# Patient Record
Sex: MALE | Race: BLACK OR AFRICAN AMERICAN | NOT HISPANIC OR LATINO
[De-identification: names, ages, dates, MRNs, and addresses within clinical notes are randomized per-mention and may not be internally consistent; named-entity substitution may affect disease eponyms.]

---

## 2017-01-10 ENCOUNTER — APPOINTMENT (OUTPATIENT)
Dept: VASCULAR SURGERY | Facility: CLINIC | Age: 62
End: 2017-01-10

## 2017-03-25 ENCOUNTER — EMERGENCY (EMERGENCY)
Facility: HOSPITAL | Age: 62
LOS: 1 days | Discharge: HOME | End: 2017-03-25
Attending: EMERGENCY MEDICINE | Admitting: EMERGENCY MEDICINE
Payer: COMMERCIAL

## 2017-03-25 VITALS
HEART RATE: 84 BPM | SYSTOLIC BLOOD PRESSURE: 112 MMHG | OXYGEN SATURATION: 98 % | TEMPERATURE: 98 F | RESPIRATION RATE: 18 BRPM | DIASTOLIC BLOOD PRESSURE: 59 MMHG

## 2017-03-25 DIAGNOSIS — Z87.2 PERSONAL HISTORY OF DISEASES OF THE SKIN AND SUBCUTANEOUS TISSUE: Chronic | ICD-10-CM

## 2017-03-25 LAB
ALBUMIN SERPL ELPH-MCNC: 4.2 G/DL — SIGNIFICANT CHANGE UP (ref 3.3–5)
ALP SERPL-CCNC: 57 U/L — SIGNIFICANT CHANGE UP (ref 40–120)
ALT FLD-CCNC: 21 U/L — SIGNIFICANT CHANGE UP (ref 4–41)
APTT BLD: 32.6 SEC — SIGNIFICANT CHANGE UP (ref 27.5–37.4)
AST SERPL-CCNC: 28 U/L — SIGNIFICANT CHANGE UP (ref 4–40)
BASOPHILS # BLD AUTO: 0.02 K/UL — SIGNIFICANT CHANGE UP (ref 0–0.2)
BASOPHILS NFR BLD AUTO: 0.4 % — SIGNIFICANT CHANGE UP (ref 0–2)
BILIRUB SERPL-MCNC: 0.4 MG/DL — SIGNIFICANT CHANGE UP (ref 0.2–1.2)
BUN SERPL-MCNC: 13 MG/DL — SIGNIFICANT CHANGE UP (ref 7–23)
CALCIUM SERPL-MCNC: 9.5 MG/DL — SIGNIFICANT CHANGE UP (ref 8.4–10.5)
CHLORIDE SERPL-SCNC: 100 MMOL/L — SIGNIFICANT CHANGE UP (ref 98–107)
CK MB BLD-MCNC: 1.82 NG/ML — SIGNIFICANT CHANGE UP (ref 1–6.6)
CK SERPL-CCNC: 248 U/L — HIGH (ref 30–200)
CK SERPL-CCNC: 271 U/L — HIGH (ref 30–200)
CK SERPL-CCNC: 284 U/L — HIGH (ref 30–200)
CO2 SERPL-SCNC: 29 MMOL/L — SIGNIFICANT CHANGE UP (ref 22–31)
CREAT SERPL-MCNC: 1.18 MG/DL — SIGNIFICANT CHANGE UP (ref 0.5–1.3)
EOSINOPHIL # BLD AUTO: 0.06 K/UL — SIGNIFICANT CHANGE UP (ref 0–0.5)
EOSINOPHIL NFR BLD AUTO: 1.2 % — SIGNIFICANT CHANGE UP (ref 0–6)
ERYTHROCYTE [SEDIMENTATION RATE] IN BLOOD: 5 MM/HR — SIGNIFICANT CHANGE UP (ref 1–15)
GLUCOSE SERPL-MCNC: 83 MG/DL — SIGNIFICANT CHANGE UP (ref 70–99)
HCT VFR BLD CALC: 41.6 % — SIGNIFICANT CHANGE UP (ref 39–50)
HGB BLD-MCNC: 13.6 G/DL — SIGNIFICANT CHANGE UP (ref 13–17)
IMM GRANULOCYTES NFR BLD AUTO: 0.2 % — SIGNIFICANT CHANGE UP (ref 0–1.5)
INR BLD: 1.06 — SIGNIFICANT CHANGE UP (ref 0.88–1.17)
LYMPHOCYTES # BLD AUTO: 1.81 K/UL — SIGNIFICANT CHANGE UP (ref 1–3.3)
LYMPHOCYTES # BLD AUTO: 36.7 % — SIGNIFICANT CHANGE UP (ref 13–44)
MCHC RBC-ENTMCNC: 27.3 PG — SIGNIFICANT CHANGE UP (ref 27–34)
MCHC RBC-ENTMCNC: 32.7 % — SIGNIFICANT CHANGE UP (ref 32–36)
MCV RBC AUTO: 83.4 FL — SIGNIFICANT CHANGE UP (ref 80–100)
MONOCYTES # BLD AUTO: 0.34 K/UL — SIGNIFICANT CHANGE UP (ref 0–0.9)
MONOCYTES NFR BLD AUTO: 6.9 % — SIGNIFICANT CHANGE UP (ref 2–14)
NEUTROPHILS # BLD AUTO: 2.69 K/UL — SIGNIFICANT CHANGE UP (ref 1.8–7.4)
NEUTROPHILS NFR BLD AUTO: 54.6 % — SIGNIFICANT CHANGE UP (ref 43–77)
PLATELET # BLD AUTO: 246 K/UL — SIGNIFICANT CHANGE UP (ref 150–400)
PMV BLD: 9.4 FL — SIGNIFICANT CHANGE UP (ref 7–13)
POTASSIUM SERPL-MCNC: 4 MMOL/L — SIGNIFICANT CHANGE UP (ref 3.5–5.3)
POTASSIUM SERPL-SCNC: 4 MMOL/L — SIGNIFICANT CHANGE UP (ref 3.5–5.3)
PROT SERPL-MCNC: 6.8 G/DL — SIGNIFICANT CHANGE UP (ref 6–8.3)
PROTHROM AB SERPL-ACNC: 11.9 SEC — SIGNIFICANT CHANGE UP (ref 9.8–13.1)
RBC # BLD: 4.99 M/UL — SIGNIFICANT CHANGE UP (ref 4.2–5.8)
RBC # FLD: 15.6 % — HIGH (ref 10.3–14.5)
SODIUM SERPL-SCNC: 140 MMOL/L — SIGNIFICANT CHANGE UP (ref 135–145)
TROPONIN T SERPL-MCNC: < 0.06 NG/ML — SIGNIFICANT CHANGE UP (ref 0–0.06)
TROPONIN T SERPL-MCNC: < 0.06 NG/ML — SIGNIFICANT CHANGE UP (ref 0–0.06)
WBC # BLD: 4.93 K/UL — SIGNIFICANT CHANGE UP (ref 3.8–10.5)
WBC # FLD AUTO: 4.93 K/UL — SIGNIFICANT CHANGE UP (ref 3.8–10.5)

## 2017-03-25 PROCEDURE — 99220: CPT | Mod: 25

## 2017-03-25 PROCEDURE — 71020: CPT | Mod: 26

## 2017-03-25 PROCEDURE — 71275 CT ANGIOGRAPHY CHEST: CPT | Mod: 26

## 2017-03-25 PROCEDURE — 74174 CTA ABD&PLVS W/CONTRAST: CPT | Mod: 26

## 2017-03-25 PROCEDURE — 93010 ELECTROCARDIOGRAM REPORT: CPT | Mod: 59

## 2017-03-25 PROCEDURE — 99284 EMERGENCY DEPT VISIT MOD MDM: CPT

## 2017-03-25 RX ORDER — NIFEDIPINE 30 MG
30 TABLET, EXTENDED RELEASE 24 HR ORAL
Qty: 0 | Refills: 0 | Status: DISCONTINUED | OUTPATIENT
Start: 2017-03-25 | End: 2017-03-29

## 2017-03-25 RX ORDER — ATORVASTATIN CALCIUM 80 MG/1
80 TABLET, FILM COATED ORAL AT BEDTIME
Qty: 0 | Refills: 0 | Status: DISCONTINUED | OUTPATIENT
Start: 2017-03-25 | End: 2017-03-29

## 2017-03-25 RX ORDER — NIFEDIPINE 30 MG
1 TABLET, EXTENDED RELEASE 24 HR ORAL
Qty: 0 | Refills: 0 | COMMUNITY

## 2017-03-25 RX ORDER — VALSARTAN 80 MG/1
1 TABLET ORAL
Qty: 0 | Refills: 0 | COMMUNITY

## 2017-03-25 RX ORDER — ASPIRIN/CALCIUM CARB/MAGNESIUM 324 MG
162 TABLET ORAL ONCE
Qty: 0 | Refills: 0 | Status: COMPLETED | OUTPATIENT
Start: 2017-03-25 | End: 2017-03-25

## 2017-03-25 RX ORDER — TAMSULOSIN HYDROCHLORIDE 0.4 MG/1
0.4 CAPSULE ORAL AT BEDTIME
Qty: 0 | Refills: 0 | Status: DISCONTINUED | OUTPATIENT
Start: 2017-03-25 | End: 2017-03-29

## 2017-03-25 RX ORDER — TAMSULOSIN HYDROCHLORIDE 0.4 MG/1
1 CAPSULE ORAL
Qty: 0 | Refills: 0 | COMMUNITY

## 2017-03-25 RX ORDER — ROSUVASTATIN CALCIUM 5 MG/1
1 TABLET ORAL
Qty: 0 | Refills: 0 | COMMUNITY

## 2017-03-25 RX ORDER — VALSARTAN 80 MG/1
320 TABLET ORAL DAILY
Qty: 0 | Refills: 0 | Status: DISCONTINUED | OUTPATIENT
Start: 2017-03-25 | End: 2017-03-29

## 2017-03-25 RX ADMIN — Medication 162 MILLIGRAM(S): at 12:34

## 2017-03-25 NOTE — ED ADULT NURSE REASSESSMENT NOTE - NS ED NURSE REASSESS COMMENT FT1
Report taken from daytime RN- pt remains A&Ox3, denies chest pain, dizziness, lightheadedness, SOB, HA, vision changes at this time. Remains in NSR on tele monitor, appears comfortable in stretcher, respirations even and unlabored, NAD noted. Repeat blood work resulted, report given to DAHLIA Dawson in CDU and pt transported to CDU.

## 2017-03-25 NOTE — ED PROVIDER NOTE - PMH
Hyperlipidemia    Hypertension DVT (deep venous thrombosis)  LLE; diagnosed January 2016, s/p 6 months anticoagulation  Hyperlipidemia    Hypertension

## 2017-03-25 NOTE — ED CDU PROVIDER NOTE - PLAN OF CARE
FOLLOW UP WITH YOUR CARDIOLOGIST WITHIN THE WEEK with results from ED to be reviewed. Follow up with your PMD within 48-72 hrs. Show copies of your reports given to you. Take all of your medications as previously prescribed. If any worsening chest pain or shortness of breath, or any concerning or new symptoms return to the ED.

## 2017-03-25 NOTE — ED ADULT TRIAGE NOTE - CHIEF COMPLAINT QUOTE
States he was walking in park x 30 minutes ago and had sudden onset of midsternal chest pain. States he had sob at the time but denies this right now. Co tingling in finger tips. Hx of htn and high cholesterol, did not take BP meds yet this morning. States he was walking in park x 30 minutes ago and had sudden onset of midsternal chest pain. States he had sob at the time but denies this right now. Co slight tingling in finger tips. Hx of htn and high cholesterol, did not take BP meds yet this morning. States he was walking in park x 30 minutes ago and had sudden onset of midsternal chest pain. States he had sob at the time but denies this right now. Co  tingling in finger tips. Hx of htn and high cholesterol, did not take BP meds yet this morning.

## 2017-03-25 NOTE — ED CDU PROVIDER NOTE - ATTENDING CONTRIBUTION TO CARE
Kuldeep Kuldeep  61yo M hx of HTN, HLD, hx of left DVT off AC for 6 months pw an episode of chest pain while walking the park this morning. Described as chest pressure, midsternal radiate to the back worse w walking, associated w sob. CP and SOB stopped when he sat down to rest. no cp now. denies any pleuritic or positional cp. denies any trauma. denies travel. denies leg swelling or calf tenderness. non smoker. last stress/echo was a year ago.   Vital signs noted. pt laying in bed no distress. normal S1-S2 No resp distress. able to speak in full and clear sentences. no wheeze, rales or stridor. no pedal edema. no calf tenderness. normal pulses bilateral feet.   plan EKG, labs, serial CE, cardiac monitor. PT evaluated by cardiology in ED.

## 2017-03-25 NOTE — ED CDU PROVIDER NOTE - CROS ED GU ALL NEG
"Chief Complaint   Patient presents with     Rectal Problem       Initial BP 96/56 (BP Location: Right arm, Cuff Size: Child)  Pulse 84  Temp 98.7  F (37.1  C) (Oral)  Ht 4' 5\" (1.346 m)  Wt 62 lb 8 oz (28.3 kg)  SpO2 98%  BMI 15.64 kg/m2 Estimated body mass index is 15.64 kg/(m^2) as calculated from the following:    Height as of this encounter: 4' 5\" (1.346 m).    Weight as of this encounter: 62 lb 8 oz (28.3 kg).  Medication Reconciliation: complete   Vanesa Ray MA      " negative...

## 2017-03-25 NOTE — ED CDU PROVIDER NOTE - PMH
DVT (deep venous thrombosis)  LLE; diagnosed January 2016, s/p 6 months anticoagulation  Hyperlipidemia    Hypertension

## 2017-03-25 NOTE — ED PROVIDER NOTE - ATTENDING CONTRIBUTION TO CARE
61yo M hx of HTN, HLD, hx of left DVT off AC for 6 months pw an episode of chest pain while walking the park this morning. Described as chest pressure, midsternal radiate to the back worse w walking, associated w sob. CP and SOB stopped when he sat down to rest. no cp now. denies any pleuritic or positional cp. denies any trauma. denies travel. denies leg swelling or calf tenderness. non smoker. last stress/echo was a year ago.   Vital signs noted. pt laying in bed no distress. normal S1-S2 No resp distress. able to speak in full and clear sentences. no wheeze, rales or stridor. no pedal edema. no calf tenderness. normal pulses bilateral feet.   plan ekg, cxr, labs. pt not tachycardic, not tachypneic, not hypoxic, cp not pleuritic unlikely its a PE. concern for ACS. gilbert guzman pt.

## 2017-03-25 NOTE — ED PROVIDER NOTE - OBJECTIVE STATEMENT
63 yo male with PMH of LLE DVT diagnosed 1/16 s/p 6 months of anticoagulation no longer taking AC, DVT in LLE 20 years ago as well, HTN (did not take BP meds today), HLD, presents to the ED with chest pain and SOB when walking in the park this morning. Patient states approximately an hour ago he was walking and had to stop twice due to substernal chest pressure/tightness that went to his back that was associated with SOB. Patient notes the chest pain was pleuritic. States pain was 7/10, still has residual discomfort but markedly improved when resting in bed. Notes this is the first such time he has had symptoms like this. Did not take anything for his symptoms today. PMD: Kinjal Jung. Seen by cardiologist 6 months ago as a f/u to his DVT, was put on a holter monitor at that time and told the results were unremarkable. No tobacco, EtOH, or drug use.

## 2017-03-25 NOTE — ED PROVIDER NOTE - MEDICAL DECISION MAKING DETAILS
61 yo male with PMH of LLE DVT diagnosed 1/16 s/p 6 months of anticoagulation no longer taking AC, DVT in LLE 20 years ago as well, HTN (did not take BP meds today), HLD, presents to the ED with chest pain and SOB when walking in the park this morning.  Plan: EKG, CXR, labs including cardiac enzymes, ASA

## 2017-03-25 NOTE — ED PROVIDER NOTE - PROGRESS NOTE DETAILS
pt denies any pleuritic cp. no cp now. Vital signs noted. not tachycardic or tachypneic. not hypoxic. dw w pt plan to monitor in CDU. pt willing to stay. pt felt mild chest pain, repeat EKG sinus w st elevation in v4-v6, different compared to prior EKG. pt denied chest pain after EKG completed. Denies any chest pressure or sob. CAROLYN guzman cardiology Dr Spencer faxed ekg to him. fax [phone] Plan repeat EKG again. Third EKG 72 sinus st st elevation in v4-v6 Again faxed to Dr Spencer. pt has no cp/sob. no complaints at this time. Dr Spencer recommends ct angio chest to rule out aortic dissection. Serial CE and ESR. He will come see pt. PT informed. called ct for emergent ct now. Dr Spencer, cardiology at bedside. Cell [phone] pending repeat CPK/Trop   Pt has no cp or sob. ct read as neg for dissection. plan is if neg trop, CDU for overnight observation. If positive trop, call cardiologist Dr Spencer. pt endorsed to Dr Gamble. pt aware of plan

## 2017-03-25 NOTE — ED ADULT NURSE NOTE - OBJECTIVE STATEMENT
Jerry RN: 63 y/o male pt, aox3, brought to rm 13. Pt presents to the ED with c/o chest pressure that started 1-1.5 hrs prior to arrival. Pt sts he was walking in the park when the pressure started, was relieved by rest, pt started walking again and felt the pressure again, pt then decided to come to the ED. Pt reports feeling SOB when he had the chest pressure. Pt also c/o mild R hip pain that started today, denies any injuries to the area. Hx of DVT on L leg x 2, 1st diagnosed 20 yrs ago, and then last year, was placed on Coumadin. Pt denies any pain on L leg. Denies any complaints at this time, denies SOB or chest pain, no abd pain, n.v.d, fever/chills. MD yin done, SL placed, labs sent, connected to the monitor:NSR, NAD, report given to primary DAHLIA Haney.

## 2017-03-25 NOTE — ED PROVIDER NOTE - FAMILY HISTORY
Sibling  Still living? No  Family history of brain aneurysm, Age at diagnosis: Age Unknown     Mother  Still living? Unknown  Family history of leukemia, Age at diagnosis: Age Unknown

## 2017-03-25 NOTE — ED PROVIDER NOTE - CARE PLAN
Principal Discharge DX:	Chest pain  Secondary Diagnosis:	Hyperlipidemia  Secondary Diagnosis:	Hypertension

## 2017-03-25 NOTE — ED ADULT NURSE NOTE - CHIEF COMPLAINT QUOTE
States he was walking in park x 30 minutes ago and had sudden onset of midsternal chest pain. States he had sob at the time but denies this right now. Co  tingling in finger tips. Hx of htn and high cholesterol, did not take BP meds yet this morning.

## 2017-03-25 NOTE — ED CDU PROVIDER NOTE - PROGRESS NOTE DETAILS
STELLA Warren: pt NAD, no chest pain, no BEST, resting comfortably. VSS. No events on tele. Discussed care of plan with patient. Daja CDU attending: No events overnight, denies any chest pain, dyspnea, nausea currently.  NAD, nontoxic, Lungs CTAB, Abd s/nt/nd, Cardiac RRR no m/r/gs.  Serial troponin negative.  Stress test pending. STELLA Warren: pt NAD, no chest pain, no BEST, resting comfortably. VSS. No events on tele. Discussed care of plan with patient.  Daja CHESTERU att: I performed a face to face evaluation of this patient and obtained a history and performed a full exam.  I agree with the history, physical exam and plan of the PA. STELLA Chávez: Pt denies CP, SOB. NAD. VSS. Lungs clear b/l. I spoke with Dr. Spencer at [phone], he is not pt cardiologist but works with pt cardiologist and will relay message of test results, and is ok with patient discharge. Discharge instructions discussed with patient and pt agrees to follow up with his cardiologist within the week. Daja CDU attending discharge summary: 63 yo man presenting with chest pain. Serial enzymes negative, stress test negative, chest pain free.  No evidence of STEMI, pericarditis, myocarditis, pulmonary embolism,  pneumothorax, pneumonia, Zoster, or esophageal perforation.  Historically not abrupt in onset, tearing or ripping, pulses symmetric, no e/o aortic dissection.  Patient informed of ED visit findings, understands plan.  Patient provided with written and further verbal instructions not included in discharge paperwork.  Patient instructed to follow up with their primary care physician in 2-3 days and return for new, worsened, or persistent symptoms. STELLA Chávez: Pt denies CP, SOB. NAD. VSS. Lungs clear b/l. I spoke with Dr. Spencer at [phone], he is not pt cardiologist but works with pt cardiologist and will relay message of test results, and is ok with patient discharge. Discharge instructions discussed with patient and pt agrees to follow up with his cardiologist within the week.  Daja att: I performed a face to face evaluation of this patient and obtained a history and performed a full exam.  I agree with the history, physical exam and plan of the PA.

## 2017-03-25 NOTE — ED CDU PROVIDER NOTE - OBJECTIVE STATEMENT
62 year old male with PMHx of DVT in jan 2016 and in 1996 (on coumadin for 6 months - no longer on anticoagulation), HLD, BPH, HTN pw substernal chest pressure/tightness during exertion today. Pt was walking and 20 mins into walk he began to feel SOB and chest tightness, episode lasted 5-6 minutes and resolved when resting, then had two subsequent episodes. 62 year old male with PMHx of DVT in jan 2016 and in 1996 (on coumadin for 6 months - no longer on anticoagulation), HLD, BPH, HTN pw substernal chest pressure/tightness and SOB during exertion today. Pt was walking and 20 mins into walk he began to feel SOB and chest tightness, episode lasted 5-6 minutes and resolved when resting, then had two subsequent episodes. Exercises 3x weekly - never experiences pain before. Denies headache, syncope, dizziness, diaphoresis, n/v/f/c, abdominal pain, calf pain/swelling, foreign travel, tobacco use.  In ED: Provider spoke with cardiologist Dr. Spencer cell [phone] - saw pt at bedside. Was faxed EKG with v4-v6 elevation. Cardiologist recommended CTA which was negative for aortic disection, ESR and serial CE, and stress test.

## 2017-03-26 VITALS
RESPIRATION RATE: 16 BRPM | SYSTOLIC BLOOD PRESSURE: 121 MMHG | DIASTOLIC BLOOD PRESSURE: 52 MMHG | TEMPERATURE: 99 F | OXYGEN SATURATION: 97 % | HEART RATE: 63 BPM

## 2017-03-26 LAB
CK MB BLD-MCNC: 1.66 NG/ML — SIGNIFICANT CHANGE UP (ref 1–6.6)
HBA1C BLD-MCNC: 5.6 % — SIGNIFICANT CHANGE UP (ref 4–5.6)
TROPONIN T SERPL-MCNC: < 0.06 NG/ML — SIGNIFICANT CHANGE UP (ref 0–0.06)

## 2017-03-26 PROCEDURE — 99217: CPT

## 2017-03-26 PROCEDURE — 93016 CV STRESS TEST SUPVJ ONLY: CPT | Mod: GC

## 2017-03-26 PROCEDURE — 78452 HT MUSCLE IMAGE SPECT MULT: CPT | Mod: 26

## 2017-03-26 PROCEDURE — 93018 CV STRESS TEST I&R ONLY: CPT | Mod: GC

## 2017-03-26 RX ADMIN — Medication 30 MILLIGRAM(S): at 06:56

## 2017-03-26 RX ADMIN — VALSARTAN 320 MILLIGRAM(S): 80 TABLET ORAL at 06:56

## 2017-03-26 RX ADMIN — TAMSULOSIN HYDROCHLORIDE 0.4 MILLIGRAM(S): 0.4 CAPSULE ORAL at 00:14

## 2017-03-26 RX ADMIN — ATORVASTATIN CALCIUM 80 MILLIGRAM(S): 80 TABLET, FILM COATED ORAL at 00:14

## 2017-03-27 ENCOUNTER — LABORATORY RESULT (OUTPATIENT)
Age: 62
End: 2017-03-27

## 2017-03-27 ENCOUNTER — APPOINTMENT (OUTPATIENT)
Dept: PULMONOLOGY | Facility: CLINIC | Age: 62
End: 2017-03-27

## 2017-03-27 VITALS
HEIGHT: 70 IN | HEART RATE: 99 BPM | DIASTOLIC BLOOD PRESSURE: 64 MMHG | BODY MASS INDEX: 32.21 KG/M2 | TEMPERATURE: 98.8 F | RESPIRATION RATE: 20 BRPM | WEIGHT: 225 LBS | SYSTOLIC BLOOD PRESSURE: 120 MMHG

## 2017-03-27 DIAGNOSIS — R07.9 CHEST PAIN, UNSPECIFIED: ICD-10-CM

## 2017-03-27 DIAGNOSIS — R06.83 SNORING: ICD-10-CM

## 2017-03-27 RX ORDER — PROMETHAZINE HYDROCHLORIDE 6.25 MG/5ML
6.25 SOLUTION ORAL
Qty: 200 | Refills: 0 | Status: DISCONTINUED | COMMUNITY
Start: 2016-12-22

## 2017-03-27 RX ORDER — IBUPROFEN 600 MG/1
600 TABLET, FILM COATED ORAL
Qty: 28 | Refills: 0 | Status: DISCONTINUED | COMMUNITY
Start: 2017-02-07

## 2017-03-27 RX ORDER — VALSARTAN AND HYDROCHLOROTHIAZIDE 320; 25 MG/1; MG/1
320-25 TABLET, FILM COATED ORAL
Qty: 90 | Refills: 0 | Status: DISCONTINUED | COMMUNITY
Start: 2017-03-02

## 2017-03-27 RX ORDER — NIFEDIPINE 30 MG/1
30 TABLET, FILM COATED, EXTENDED RELEASE ORAL
Qty: 180 | Refills: 0 | Status: DISCONTINUED | COMMUNITY
Start: 2016-12-30

## 2017-03-27 RX ORDER — ERGOCALCIFEROL 1.25 MG/1
1.25 MG CAPSULE, LIQUID FILLED ORAL
Qty: 4 | Refills: 0 | Status: ACTIVE | COMMUNITY
Start: 2017-03-07

## 2017-03-27 RX ORDER — SULFAMETHOXAZOLE AND TRIMETHOPRIM 800; 160 MG/1; MG/1
800-160 TABLET ORAL
Qty: 14 | Refills: 0 | Status: DISCONTINUED | COMMUNITY
Start: 2017-01-25

## 2017-03-28 ENCOUNTER — OUTPATIENT (OUTPATIENT)
Dept: OUTPATIENT SERVICES | Facility: HOSPITAL | Age: 62
LOS: 1 days | End: 2017-03-28
Payer: COMMERCIAL

## 2017-03-28 ENCOUNTER — APPOINTMENT (OUTPATIENT)
Dept: SLEEP CENTER | Facility: CLINIC | Age: 62
End: 2017-03-28

## 2017-03-28 DIAGNOSIS — Z87.2 PERSONAL HISTORY OF DISEASES OF THE SKIN AND SUBCUTANEOUS TISSUE: Chronic | ICD-10-CM

## 2017-03-28 LAB
25(OH)D3 SERPL-MCNC: 31.9 NG/ML
A1AT SERPL-MCNC: 128 MG/DL
ALBUMIN SERPL ELPH-MCNC: 4.4 G/DL
ALP BLD-CCNC: 58 U/L
ALT SERPL-CCNC: 18 U/L
ANION GAP SERPL CALC-SCNC: 19 MMOL/L
APTT BLD: 31.6 SEC
AST SERPL-CCNC: 28 U/L
B2 MICROGLOB SERPL-MCNC: 1.4 MG/L
BASOPHILS # BLD AUTO: 0.02 K/UL
BASOPHILS NFR BLD AUTO: 0.4 %
BILIRUB SERPL-MCNC: 0.3 MG/DL
BUN SERPL-MCNC: 17 MG/DL
CALCIUM SERPL-MCNC: 9.6 MG/DL
CALCIUM SERPL-MCNC: 9.6 MG/DL
CARDIOLIPIN AB SER IA-ACNC: NEGATIVE
CCP AB SER IA-ACNC: <8 UNITS
CHLORIDE SERPL-SCNC: 103 MMOL/L
CO2 SERPL-SCNC: 22 MMOL/L
CREAT SERPL-MCNC: 1.18 MG/DL
CRP SERPL-MCNC: <0.2 MG/DL
DEPRECATED KAPPA LC FREE/LAMBDA SER: 1.39 RATIO
EOSINOPHIL # BLD AUTO: 0.08 K/UL
EOSINOPHIL NFR BLD AUTO: 1.4 %
ERYTHROCYTE [SEDIMENTATION RATE] IN BLOOD BY WESTERGREN METHOD: 17 MM/HR
FACT VIII ACT/NOR PPP: 142 %
FOLATE RBC-MCNC: 907 NG/ML
GLUCOSE SERPL-MCNC: 98 MG/DL
HCT VFR BLD CALC: 43 %
HCT VFR BLD CALC: 43 %
HCYS SERPL-MCNC: 9 UMOL/L
HGB BLD-MCNC: 13.8 G/DL
IGA SER QL IEP: 259 MG/DL
IGG SER QL IEP: 1140 MG/DL
IGM SER QL IEP: 107 MG/DL
IMM GRANULOCYTES NFR BLD AUTO: 0.2 %
INR PPP: 1 RATIO
KAPPA LC CSF-MCNC: 1.19 MG/DL
KAPPA LC SERPL-MCNC: 1.66 MG/DL
LYMPHOCYTES # BLD AUTO: 1.98 K/UL
LYMPHOCYTES NFR BLD AUTO: 35.5 %
MAN DIFF?: NORMAL
MCHC RBC-ENTMCNC: 26.5 PG
MCHC RBC-ENTMCNC: 32.1 GM/DL
MCV RBC AUTO: 82.7 FL
MONOCYTES # BLD AUTO: 0.49 K/UL
MONOCYTES NFR BLD AUTO: 8.8 %
MPO AB + PR3 PNL SER: NORMAL
NEUTROPHILS # BLD AUTO: 3 K/UL
NEUTROPHILS NFR BLD AUTO: 53.7 %
NT-PROBNP SERPL-MCNC: 17 PG/ML
PARATHYROID HORMONE INTACT: 47 PG/ML
PHOSPHATE SERPL-MCNC: 2.7 MG/DL
PLATELET # BLD AUTO: 283 K/UL
POTASSIUM SERPL-SCNC: 3.7 MMOL/L
PROT SERPL-MCNC: 7.3 G/DL
PT BLD: 11.3 SEC
RBC # BLD: 5.2 M/UL
RBC # FLD: 16 %
RF+CCP IGG SER-IMP: NEGATIVE
RHEUMATOID FACT SER QL: <7 IU/ML
SODIUM SERPL-SCNC: 144 MMOL/L
T3 SERPL-MCNC: 91 NG/DL
T3FREE SERPL-MCNC: 2.47 PG/ML
T3RU NFR SERPL: 1.09 INDEX
T4 FREE SERPL-MCNC: 1 NG/DL
TSH SERPL-ACNC: 1.5 UIU/ML
URATE SERPL-MCNC: 7.4 MG/DL
VIT B12 SERPL-MCNC: 1177 PG/ML
WBC # FLD AUTO: 5.58 K/UL

## 2017-03-28 PROCEDURE — 95810 POLYSOM 6/> YRS 4/> PARAM: CPT

## 2017-03-29 DIAGNOSIS — G47.33 OBSTRUCTIVE SLEEP APNEA (ADULT) (PEDIATRIC): ICD-10-CM

## 2017-03-29 LAB
ADJUSTED MITOGEN: >10 IU/ML
ADJUSTED TB AG: -0.13 IU/ML
ANACR T: NEGATIVE
COLLAGEN CTX SERPL-MCNC: 157 PG/ML
DNA PLOIDY SPEC FC-IMP: NORMAL
M TB IFN-G BLD-IMP: NEGATIVE
QUANTIFERON GOLD NIL: 0.41 IU/ML
TOTAL IGE SMQN RAST: 63 KU/L

## 2017-03-30 LAB
ENA SCL70 IGG SER IA-ACNC: <0.2 AL
ENA SS-A AB SER IA-ACNC: <0.2 AL
ENA SS-B AB SER IA-ACNC: <0.2 AL

## 2017-04-03 LAB — T4 FREE SERPL DIALY-MCNC: 1 NG/DL

## 2017-04-04 ENCOUNTER — OUTPATIENT (OUTPATIENT)
Dept: OUTPATIENT SERVICES | Facility: HOSPITAL | Age: 62
LOS: 1 days | End: 2017-04-04
Payer: COMMERCIAL

## 2017-04-04 ENCOUNTER — APPOINTMENT (OUTPATIENT)
Dept: NUCLEAR MEDICINE | Facility: HOSPITAL | Age: 62
End: 2017-04-04

## 2017-04-04 DIAGNOSIS — I82.409 ACUTE EMBOLISM AND THROMBOSIS OF UNSPECIFIED DEEP VEINS OF UNSPECIFIED LOWER EXTREMITY: ICD-10-CM

## 2017-04-04 DIAGNOSIS — Z87.2 PERSONAL HISTORY OF DISEASES OF THE SKIN AND SUBCUTANEOUS TISSUE: Chronic | ICD-10-CM

## 2017-04-04 DIAGNOSIS — R07.9 CHEST PAIN, UNSPECIFIED: ICD-10-CM

## 2017-04-04 DIAGNOSIS — I25.10 ATHEROSCLEROTIC HEART DISEASE OF NATIVE CORONARY ARTERY WITHOUT ANGINA PECTORIS: ICD-10-CM

## 2017-04-04 PROCEDURE — A9567: CPT

## 2017-04-04 PROCEDURE — 71020: CPT | Mod: 26

## 2017-04-04 PROCEDURE — A9540: CPT

## 2017-04-04 PROCEDURE — 78582 LUNG VENTILAT&PERFUS IMAGING: CPT

## 2017-04-04 PROCEDURE — 78582 LUNG VENTILAT&PERFUS IMAGING: CPT | Mod: 26

## 2017-04-04 PROCEDURE — 71046 X-RAY EXAM CHEST 2 VIEWS: CPT

## 2017-04-11 ENCOUNTER — APPOINTMENT (OUTPATIENT)
Dept: CV DIAGNOSITCS | Facility: HOSPITAL | Age: 62
End: 2017-04-11

## 2017-04-11 ENCOUNTER — OUTPATIENT (OUTPATIENT)
Dept: OUTPATIENT SERVICES | Facility: HOSPITAL | Age: 62
LOS: 1 days | End: 2017-04-11
Payer: COMMERCIAL

## 2017-04-11 DIAGNOSIS — I82.409 ACUTE EMBOLISM AND THROMBOSIS OF UNSPECIFIED DEEP VEINS OF UNSPECIFIED LOWER EXTREMITY: ICD-10-CM

## 2017-04-11 DIAGNOSIS — Z87.2 PERSONAL HISTORY OF DISEASES OF THE SKIN AND SUBCUTANEOUS TISSUE: Chronic | ICD-10-CM

## 2017-04-11 PROCEDURE — 93320 DOPPLER ECHO COMPLETE: CPT | Mod: 26

## 2017-04-11 PROCEDURE — 93325 DOPPLER ECHO COLOR FLOW MAPG: CPT | Mod: 26

## 2017-04-11 PROCEDURE — 93016 CV STRESS TEST SUPVJ ONLY: CPT

## 2017-04-11 PROCEDURE — 93351 STRESS TTE COMPLETE: CPT

## 2017-04-11 PROCEDURE — 93018 CV STRESS TEST I&R ONLY: CPT

## 2017-04-11 PROCEDURE — 93320 DOPPLER ECHO COMPLETE: CPT

## 2017-04-11 PROCEDURE — 93350 STRESS TTE ONLY: CPT | Mod: 26

## 2017-04-11 PROCEDURE — 93325 DOPPLER ECHO COLOR FLOW MAPG: CPT

## 2017-04-12 ENCOUNTER — APPOINTMENT (OUTPATIENT)
Dept: PULMONOLOGY | Facility: CLINIC | Age: 62
End: 2017-04-12

## 2017-04-18 ENCOUNTER — RESULT REVIEW (OUTPATIENT)
Age: 62
End: 2017-04-18

## 2017-05-09 ENCOUNTER — APPOINTMENT (OUTPATIENT)
Dept: PULMONOLOGY | Facility: CLINIC | Age: 62
End: 2017-05-09

## 2017-05-09 VITALS
DIASTOLIC BLOOD PRESSURE: 70 MMHG | HEART RATE: 78 BPM | SYSTOLIC BLOOD PRESSURE: 120 MMHG | BODY MASS INDEX: 31.78 KG/M2 | HEIGHT: 70 IN | WEIGHT: 222 LBS | RESPIRATION RATE: 16 BRPM | TEMPERATURE: 98.5 F

## 2017-07-03 ENCOUNTER — APPOINTMENT (OUTPATIENT)
Dept: SLEEP CENTER | Facility: CLINIC | Age: 62
End: 2017-07-03

## 2017-07-30 ENCOUNTER — MOBILE ON CALL (OUTPATIENT)
Age: 62
End: 2017-07-30

## 2017-11-07 ENCOUNTER — APPOINTMENT (OUTPATIENT)
Dept: PULMONOLOGY | Facility: CLINIC | Age: 62
End: 2017-11-07

## 2018-03-17 ENCOUNTER — OUTPATIENT (OUTPATIENT)
Dept: OUTPATIENT SERVICES | Facility: HOSPITAL | Age: 63
LOS: 1 days | End: 2018-03-17
Payer: COMMERCIAL

## 2018-03-17 ENCOUNTER — APPOINTMENT (OUTPATIENT)
Dept: CT IMAGING | Facility: IMAGING CENTER | Age: 63
End: 2018-03-17
Payer: COMMERCIAL

## 2018-03-17 DIAGNOSIS — Z00.8 ENCOUNTER FOR OTHER GENERAL EXAMINATION: ICD-10-CM

## 2018-03-17 DIAGNOSIS — Z87.2 PERSONAL HISTORY OF DISEASES OF THE SKIN AND SUBCUTANEOUS TISSUE: Chronic | ICD-10-CM

## 2018-03-17 PROCEDURE — 74177 CT ABD & PELVIS W/CONTRAST: CPT | Mod: 26

## 2018-03-17 PROCEDURE — 74177 CT ABD & PELVIS W/CONTRAST: CPT

## 2018-03-17 PROCEDURE — 82565 ASSAY OF CREATININE: CPT

## 2018-09-01 ENCOUNTER — EMERGENCY (EMERGENCY)
Facility: HOSPITAL | Age: 63
LOS: 1 days | Discharge: HOME | End: 2018-09-01
Attending: EMERGENCY MEDICINE
Payer: COMMERCIAL

## 2018-09-01 VITALS
HEART RATE: 92 BPM | RESPIRATION RATE: 18 BRPM | TEMPERATURE: 98 F | SYSTOLIC BLOOD PRESSURE: 132 MMHG | OXYGEN SATURATION: 98 % | DIASTOLIC BLOOD PRESSURE: 72 MMHG

## 2018-09-01 DIAGNOSIS — Z87.2 PERSONAL HISTORY OF DISEASES OF THE SKIN AND SUBCUTANEOUS TISSUE: Chronic | ICD-10-CM

## 2018-09-01 PROBLEM — I82.409 ACUTE EMBOLISM AND THROMBOSIS OF UNSPECIFIED DEEP VEINS OF UNSPECIFIED LOWER EXTREMITY: Chronic | Status: ACTIVE | Noted: 2017-03-25

## 2018-09-01 LAB
ALBUMIN SERPL ELPH-MCNC: 4.4 G/DL — SIGNIFICANT CHANGE UP (ref 3.3–5)
ALP SERPL-CCNC: 77 U/L — SIGNIFICANT CHANGE UP (ref 40–120)
ALT FLD-CCNC: 19 U/L — SIGNIFICANT CHANGE UP (ref 10–45)
ANION GAP SERPL CALC-SCNC: 12 MMOL/L — SIGNIFICANT CHANGE UP (ref 5–17)
APTT BLD: 31.8 SEC — SIGNIFICANT CHANGE UP (ref 27.5–37.4)
AST SERPL-CCNC: 25 U/L — SIGNIFICANT CHANGE UP (ref 10–40)
BASE EXCESS BLDV CALC-SCNC: 3.7 MMOL/L — HIGH (ref -2–2)
BASOPHILS # BLD AUTO: 0 K/UL — SIGNIFICANT CHANGE UP (ref 0–0.2)
BASOPHILS NFR BLD AUTO: 0.7 % — SIGNIFICANT CHANGE UP (ref 0–2)
BILIRUB SERPL-MCNC: 0.4 MG/DL — SIGNIFICANT CHANGE UP (ref 0.2–1.2)
BUN SERPL-MCNC: 15 MG/DL — SIGNIFICANT CHANGE UP (ref 7–23)
CA-I SERPL-SCNC: 1.24 MMOL/L — SIGNIFICANT CHANGE UP (ref 1.12–1.3)
CALCIUM SERPL-MCNC: 9.4 MG/DL — SIGNIFICANT CHANGE UP (ref 8.4–10.5)
CHLORIDE BLDV-SCNC: 107 MMOL/L — SIGNIFICANT CHANGE UP (ref 96–108)
CHLORIDE SERPL-SCNC: 105 MMOL/L — SIGNIFICANT CHANGE UP (ref 96–108)
CO2 BLDV-SCNC: 32 MMOL/L — HIGH (ref 22–30)
CO2 SERPL-SCNC: 25 MMOL/L — SIGNIFICANT CHANGE UP (ref 22–31)
CREAT SERPL-MCNC: 1.24 MG/DL — SIGNIFICANT CHANGE UP (ref 0.5–1.3)
EOSINOPHIL # BLD AUTO: 0.2 K/UL — SIGNIFICANT CHANGE UP (ref 0–0.5)
EOSINOPHIL NFR BLD AUTO: 2.6 % — SIGNIFICANT CHANGE UP (ref 0–6)
GAS PNL BLDV: 142 MMOL/L — SIGNIFICANT CHANGE UP (ref 136–145)
GAS PNL BLDV: SIGNIFICANT CHANGE UP
GAS PNL BLDV: SIGNIFICANT CHANGE UP
GLUCOSE BLDV-MCNC: 81 MG/DL — SIGNIFICANT CHANGE UP (ref 70–99)
GLUCOSE SERPL-MCNC: 92 MG/DL — SIGNIFICANT CHANGE UP (ref 70–99)
HCO3 BLDV-SCNC: 30 MMOL/L — HIGH (ref 21–29)
HCT VFR BLD CALC: 44.9 % — SIGNIFICANT CHANGE UP (ref 39–50)
HCT VFR BLDA CALC: 48 % — SIGNIFICANT CHANGE UP (ref 39–50)
HGB BLD CALC-MCNC: 15.6 G/DL — SIGNIFICANT CHANGE UP (ref 13–17)
HGB BLD-MCNC: 14.6 G/DL — SIGNIFICANT CHANGE UP (ref 13–17)
INR BLD: 1.1 RATIO — SIGNIFICANT CHANGE UP (ref 0.88–1.16)
LACTATE BLDV-MCNC: 1.9 MMOL/L — SIGNIFICANT CHANGE UP (ref 0.7–2)
LYMPHOCYTES # BLD AUTO: 2.4 K/UL — SIGNIFICANT CHANGE UP (ref 1–3.3)
LYMPHOCYTES # BLD AUTO: 39.4 % — SIGNIFICANT CHANGE UP (ref 13–44)
MCHC RBC-ENTMCNC: 27.5 PG — SIGNIFICANT CHANGE UP (ref 27–34)
MCHC RBC-ENTMCNC: 32.6 GM/DL — SIGNIFICANT CHANGE UP (ref 32–36)
MCV RBC AUTO: 84.5 FL — SIGNIFICANT CHANGE UP (ref 80–100)
MONOCYTES # BLD AUTO: 0.6 K/UL — SIGNIFICANT CHANGE UP (ref 0–0.9)
MONOCYTES NFR BLD AUTO: 9.2 % — SIGNIFICANT CHANGE UP (ref 2–14)
NEUTROPHILS # BLD AUTO: 3 K/UL — SIGNIFICANT CHANGE UP (ref 1.8–7.4)
NEUTROPHILS NFR BLD AUTO: 48.1 % — SIGNIFICANT CHANGE UP (ref 43–77)
PCO2 BLDV: 57 MMHG — HIGH (ref 35–50)
PH BLDV: 7.35 — SIGNIFICANT CHANGE UP (ref 7.35–7.45)
PLATELET # BLD AUTO: 198 K/UL — SIGNIFICANT CHANGE UP (ref 150–400)
PO2 BLDV: 32 MMHG — SIGNIFICANT CHANGE UP (ref 25–45)
POTASSIUM BLDV-SCNC: 3.3 MMOL/L — LOW (ref 3.5–5.3)
POTASSIUM SERPL-MCNC: 3.7 MMOL/L — SIGNIFICANT CHANGE UP (ref 3.5–5.3)
POTASSIUM SERPL-SCNC: 3.7 MMOL/L — SIGNIFICANT CHANGE UP (ref 3.5–5.3)
PROT SERPL-MCNC: 7.1 G/DL — SIGNIFICANT CHANGE UP (ref 6–8.3)
PROTHROM AB SERPL-ACNC: 11.9 SEC — SIGNIFICANT CHANGE UP (ref 9.8–12.7)
RBC # BLD: 5.31 M/UL — SIGNIFICANT CHANGE UP (ref 4.2–5.8)
RBC # FLD: 13.8 % — SIGNIFICANT CHANGE UP (ref 10.3–14.5)
SAO2 % BLDV: 56 % — LOW (ref 67–88)
SODIUM SERPL-SCNC: 142 MMOL/L — SIGNIFICANT CHANGE UP (ref 135–145)
TROPONIN T, HIGH SENSITIVITY RESULT: 7 NG/L — SIGNIFICANT CHANGE UP (ref 0–51)
TROPONIN T, HIGH SENSITIVITY RESULT: 8 NG/L — SIGNIFICANT CHANGE UP (ref 0–51)
WBC # BLD: 6.2 K/UL — SIGNIFICANT CHANGE UP (ref 3.8–10.5)
WBC # FLD AUTO: 6.2 K/UL — SIGNIFICANT CHANGE UP (ref 3.8–10.5)

## 2018-09-01 PROCEDURE — 99218: CPT

## 2018-09-01 PROCEDURE — 71046 X-RAY EXAM CHEST 2 VIEWS: CPT | Mod: 26

## 2018-09-01 PROCEDURE — 93010 ELECTROCARDIOGRAM REPORT: CPT

## 2018-09-01 RX ORDER — ATORVASTATIN CALCIUM 80 MG/1
80 TABLET, FILM COATED ORAL AT BEDTIME
Qty: 0 | Refills: 0 | Status: DISCONTINUED | OUTPATIENT
Start: 2018-09-01 | End: 2018-09-05

## 2018-09-01 RX ORDER — NIFEDIPINE 30 MG
30 TABLET, EXTENDED RELEASE 24 HR ORAL
Qty: 0 | Refills: 0 | Status: DISCONTINUED | OUTPATIENT
Start: 2018-09-01 | End: 2018-09-05

## 2018-09-01 RX ORDER — APIXABAN 2.5 MG/1
2.5 TABLET, FILM COATED ORAL EVERY 12 HOURS
Qty: 0 | Refills: 0 | Status: DISCONTINUED | OUTPATIENT
Start: 2018-09-01 | End: 2018-09-05

## 2018-09-01 RX ORDER — FAMOTIDINE 10 MG/ML
20 INJECTION INTRAVENOUS ONCE
Qty: 0 | Refills: 0 | Status: COMPLETED | OUTPATIENT
Start: 2018-09-01 | End: 2018-09-01

## 2018-09-01 RX ORDER — ASPIRIN/CALCIUM CARB/MAGNESIUM 324 MG
325 TABLET ORAL ONCE
Qty: 0 | Refills: 0 | Status: COMPLETED | OUTPATIENT
Start: 2018-09-01 | End: 2018-09-01

## 2018-09-01 RX ORDER — SODIUM CHLORIDE 9 MG/ML
3 INJECTION INTRAMUSCULAR; INTRAVENOUS; SUBCUTANEOUS ONCE
Qty: 0 | Refills: 0 | Status: COMPLETED | OUTPATIENT
Start: 2018-09-01 | End: 2018-09-01

## 2018-09-01 RX ORDER — APIXABAN 2.5 MG/1
5 TABLET, FILM COATED ORAL EVERY 12 HOURS
Qty: 0 | Refills: 0 | Status: DISCONTINUED | OUTPATIENT
Start: 2018-09-01 | End: 2018-09-01

## 2018-09-01 RX ADMIN — ATORVASTATIN CALCIUM 80 MILLIGRAM(S): 80 TABLET, FILM COATED ORAL at 23:43

## 2018-09-01 RX ADMIN — FAMOTIDINE 20 MILLIGRAM(S): 10 INJECTION INTRAVENOUS at 19:26

## 2018-09-01 RX ADMIN — SODIUM CHLORIDE 3 MILLILITER(S): 9 INJECTION INTRAMUSCULAR; INTRAVENOUS; SUBCUTANEOUS at 17:51

## 2018-09-01 RX ADMIN — APIXABAN 2.5 MILLIGRAM(S): 2.5 TABLET, FILM COATED ORAL at 23:43

## 2018-09-01 RX ADMIN — Medication 30 MILLIGRAM(S): at 23:43

## 2018-09-01 RX ADMIN — Medication 325 MILLIGRAM(S): at 17:51

## 2018-09-01 NOTE — ED PROVIDER NOTE - PHYSICAL EXAMINATION
heart: no tachycardia  lungs: clear   extremities: no pedal edema  constitutional: normal  All else unremarkable

## 2018-09-01 NOTE — ED CDU PROVIDER INITIAL DAY NOTE - DETAILS
-[facility]  -[facility]  -[facility] EVAL  -STRESS TEST  -CASE D/W ATTENDING

## 2018-09-01 NOTE — ED ADULT NURSE NOTE - OBJECTIVE STATEMENT
63 yr old male amb to ed c/o "chest tightness/discomfort  around chest " x 1 week. Pt awake alert and orientedx3.  Resp even and nonlab .C/o increased sob on exertion. Denies dizziness/lightheadedness Denies n/v Denies abd pain C/o intermittent palpitations. Has h/o HTN on new HTN med. Denies pedal edema or increased swelling to extremities. Denies coughing. Afebrile. Has varicose vein lle. Denies recent travel. Had stress text x 6 months ago.  PMD N Marcio.

## 2018-09-01 NOTE — ED CDU PROVIDER INITIAL DAY NOTE - PHYSICAL EXAMINATION
CONSTITUTIONAL: Patient is awake, alert and oriented x 3. Patient is well appearing and in no acute distress.  HEAD: NCAT,   EYES: PERRL b/l, EOMI,   ENT: Airway patent, Nasal mucosa clear. Mouth with normal mucosa. Throat has no vesicles, no oropharyngeal exudates and uvula is midline.  NECK: supple,   LUNGS: CTA B/L,  HEART: RRR.+S1S2 no murmurs,   ABDOMEN: Soft nd/nt+bs no rebound or guarding.   EXTREMITY: no edema or calf tenderness b/l, FROM upper and lower ext b/l  SKIN: with no rash or lesions.   NEURO: Cn3-12 grossly intact. Strength5/5UE/LE.NmlSensation.Gait normal.

## 2018-09-01 NOTE — ED CDU PROVIDER INITIAL DAY NOTE - MEDICAL DECISION MAKING DETAILS
DDx: ACS, angina pectoris. Patient is currently stable. Will continue to monitor and assess after stress test to determine proper disposition.  ATTG: Dr. Gomez

## 2018-09-01 NOTE — ED PROVIDER NOTE - MEDICAL DECISION MAKING DETAILS
62 y/o M with pmhx of DVT of left leg on Eliquis, hyperlipidemia, HTN, and pshx of removal of skin keloid   presenting with cp feeling like pressure. Denies nausea/ sob. Plan: cardiac workup and reassess. 64 y/o M with pmhx of DVT of left leg on Eliquis, hyperlipidemia, HTN, and pshx of removal of skin keloid   presenting with cp feeling like pressure. Denies nausea/ sob. Plan: cardiac workup and reassess.  ATTG: Dr. Gomez

## 2018-09-01 NOTE — ED PROVIDER NOTE - OBJECTIVE STATEMENT
64 y/o M with pmhx of DVT of left leg on Eliquis, hyperlipidemia, HTN, and pshx of removal of skin keloid presents with non radiating generalized chest tightness x1 week, worsening today. Chest discomfort is worse with exertion. Pt had stress test a year and a half go, no remarkable findings. Denies any other complaints at this time. 62 y/o M with pmhx of DVT of left leg on Eliquis, hyperlipidemia, HTN, and pshx of removal of skin keloid presents with non radiating generalized chest tightness x1 week, worsening today around 2 PM so decided to come to ED. Chest discomfort is worse with exertion. No hx of heart attack. Pt had stress test a year and a half go, no remarkable findings. Denies nausea/ SOB or any other complaints at this time.

## 2018-09-01 NOTE — ED ADULT NURSE REASSESSMENT NOTE - NS ED NURSE REASSESS COMMENT FT1
Patient reports feeling the same after getting pepcid; patient to go to CDU.
Received report from ED RN Mechelle; patient A&ox3 and afebrile- endorses discomfort in the epigastric area- Dr Gmoez aware. VSS. 18 g IV in L AC patent and site WNL.
Pt received from DAHLIA Fitzgerald. Pt oriented to CDU & plan of care was discussed. Pt states he is having some "chest tightness/stiffness". Pt denies any chest pain, SOB, dizziness or palpitations as of now. Pt on tele in NSR, HR in the 60's. Pt awaiting stress test. Pt educated to tell RN or PA if he has any worsening symptoms. Safety & comfort measures maintained. Call bell in reach. Will continue to monitor.

## 2018-09-01 NOTE — ED CDU PROVIDER INITIAL DAY NOTE - PROGRESS NOTE DETAILS
CDU NOTE STELLA Mckeon: VSS NAD. Patient is resting comfortably and is without any complaints. No acute events on tele

## 2018-09-01 NOTE — ED ADULT NURSE NOTE - NSIMPLEMENTINTERV_GEN_ALL_ED
Implemented All Universal Safety Interventions:  [place] to call system. Call bell, personal items and telephone within reach. Instruct patient to call for assistance. Room bathroom lighting operational. Non-slip footwear when patient is off stretcher. Physically safe environment: no spills, clutter or unnecessary equipment. Stretcher in lowest position, wheels locked, appropriate side rails in place.

## 2018-09-01 NOTE — ED CDU PROVIDER INITIAL DAY NOTE - OBJECTIVE STATEMENT
62 y/o M with pmhx of DVT of left leg on Eliquis, hyperlipidemia, HTN, and pshx of removal of skin keloid presents with non radiating generalized chest tightness x1 week, worsening today around 2 PM so decided to come to ED. Chest discomfort is worse with exertion. No hx of heart attack. Pt had stress test a year and a half go, no remarkable findings. Denies nausea/ SOB or any other complaints at this time.    In ED patient labs wnl, trop 8 w/ delta trop 7. Normal CXR. Patient sent to CDU for further work-up

## 2018-09-02 VITALS
RESPIRATION RATE: 17 BRPM | DIASTOLIC BLOOD PRESSURE: 68 MMHG | OXYGEN SATURATION: 95 % | SYSTOLIC BLOOD PRESSURE: 107 MMHG | TEMPERATURE: 98 F | HEART RATE: 73 BPM

## 2018-09-02 LAB
CHOLEST SERPL-MCNC: 125 MG/DL — SIGNIFICANT CHANGE UP (ref 10–199)
HBA1C BLD-MCNC: 5.6 % — SIGNIFICANT CHANGE UP (ref 4–5.6)
HDLC SERPL-MCNC: 44 MG/DL — SIGNIFICANT CHANGE UP
LIPID PNL WITH DIRECT LDL SERPL: 67 MG/DL — SIGNIFICANT CHANGE UP
TOTAL CHOLESTEROL/HDL RATIO MEASUREMENT: 2.8 RATIO — LOW (ref 3.4–9.6)
TRIGL SERPL-MCNC: 71 MG/DL — SIGNIFICANT CHANGE UP (ref 10–149)

## 2018-09-02 PROCEDURE — 93016 CV STRESS TEST SUPVJ ONLY: CPT

## 2018-09-02 PROCEDURE — 84132 ASSAY OF SERUM POTASSIUM: CPT

## 2018-09-02 PROCEDURE — 83605 ASSAY OF LACTIC ACID: CPT

## 2018-09-02 PROCEDURE — 96374 THER/PROPH/DIAG INJ IV PUSH: CPT

## 2018-09-02 PROCEDURE — 82435 ASSAY OF BLOOD CHLORIDE: CPT

## 2018-09-02 PROCEDURE — 78452 HT MUSCLE IMAGE SPECT MULT: CPT

## 2018-09-02 PROCEDURE — 99245 OFF/OP CONSLTJ NEW/EST HI 55: CPT

## 2018-09-02 PROCEDURE — 85730 THROMBOPLASTIN TIME PARTIAL: CPT

## 2018-09-02 PROCEDURE — 84295 ASSAY OF SERUM SODIUM: CPT

## 2018-09-02 PROCEDURE — G0378: CPT

## 2018-09-02 PROCEDURE — 83036 HEMOGLOBIN GLYCOSYLATED A1C: CPT

## 2018-09-02 PROCEDURE — 84484 ASSAY OF TROPONIN QUANT: CPT

## 2018-09-02 PROCEDURE — 99217: CPT

## 2018-09-02 PROCEDURE — 93018 CV STRESS TEST I&R ONLY: CPT

## 2018-09-02 PROCEDURE — 85027 COMPLETE CBC AUTOMATED: CPT

## 2018-09-02 PROCEDURE — 78452 HT MUSCLE IMAGE SPECT MULT: CPT | Mod: 26

## 2018-09-02 PROCEDURE — 85014 HEMATOCRIT: CPT

## 2018-09-02 PROCEDURE — 82330 ASSAY OF CALCIUM: CPT

## 2018-09-02 PROCEDURE — 82947 ASSAY GLUCOSE BLOOD QUANT: CPT

## 2018-09-02 PROCEDURE — 93005 ELECTROCARDIOGRAM TRACING: CPT

## 2018-09-02 PROCEDURE — 71046 X-RAY EXAM CHEST 2 VIEWS: CPT

## 2018-09-02 PROCEDURE — 99284 EMERGENCY DEPT VISIT MOD MDM: CPT | Mod: 25

## 2018-09-02 PROCEDURE — 80061 LIPID PANEL: CPT

## 2018-09-02 PROCEDURE — A9500: CPT

## 2018-09-02 PROCEDURE — 93017 CV STRESS TEST TRACING ONLY: CPT

## 2018-09-02 PROCEDURE — 82803 BLOOD GASES ANY COMBINATION: CPT

## 2018-09-02 PROCEDURE — 85610 PROTHROMBIN TIME: CPT

## 2018-09-02 PROCEDURE — 80053 COMPREHEN METABOLIC PANEL: CPT

## 2018-09-02 RX ADMIN — APIXABAN 2.5 MILLIGRAM(S): 2.5 TABLET, FILM COATED ORAL at 14:33

## 2018-09-02 RX ADMIN — Medication 30 MILLIGRAM(S): at 14:33

## 2018-09-02 NOTE — ED CDU PROVIDER SUBSEQUENT DAY NOTE - PROGRESS NOTE DETAILS
Pt seen at bedside. Pt in NAD, comfortable. VS stable from last reading. No events on tele- NSR, HR-69. Pt has no complaints at this time. Denies CP, back pain, jaw/neck/arm pain, paresthesias, numbness, weakness, palpitations, SOB, abdo pain, n/v/d, dysuria, hematuria, dizziness, lightheadedness. Will continue to monitor. Pt at Nuc Stress Dr. Hector called to say that patient's stress test is very mildly abnormal, he will see the pt. -Rickie Toledo PA-C Pt in NAD, comfortable. VSS. No events on tele. Pt has no complaints at this time. Will continue to monitor. Nuc stress test showed: ECG Changes: ST Depression: 1 mm upsloping in leads V5, V6 started at 05:00 min of exercise at HR of 139 and persisted 05:00 min into recovery.  (LVEF = 47 %;LVEDV = 131 ml.), revealing mild hypokinesis in basal infero-lateral wall(s).  There are small-sized mild to moderate fixed defects in the mid to basal anteroseptum and inferoapical walls.  There is a medium-sized, moderate fixed defect in the mid to basal inferolateral wall. With prone imaging, the anteroseptum and inferoapical walls correct; the inferolateral wall predominantly corrects, but there remains a mild, fixed defect, which may suggest infarct.  Pt evaluated at bedside by cardiology, Dr. Neri Hector, who recommends DC with non-emergent outpatient cardiology f/u. Discussed plan with pt. All questions answered. d/w Dr. Beaver, pt seen by attending. Pt stable and ready for DC.

## 2018-09-02 NOTE — ED CDU PROVIDER SUBSEQUENT DAY NOTE - ATTENDING CONTRIBUTION TO CARE
64 y/o AA M with pmhx of DVT of left leg on Eliquis, hyperlipidemia, HTN, PW several day hx of sscp rad across precordium lasting upto 20min, without radiaiton, No ptt or alleviating factors. Now pain free. NCAT neck supple chest clear anterior & posterior abd soft neuro no focal defects. cv no rubs, gallops or murmurs  John Beaver MD, Facep

## 2018-09-02 NOTE — ED CDU PROVIDER SUBSEQUENT DAY NOTE - PHYSICAL EXAMINATION
Pt in NAD, A&O x3. CN 2-12 grossly intact. Head NCAT, sclera white without injection, PERRLA. Nose without deformity, no nasal DC. No auricular tenderness. Lips and oral cavity without obvious lesions or deformity. Neck supple FROM. No retractions or chest wall deformities. No chest wall tenderness, CTA anterior and posterior b/l, no wheezes, rales, or rhonchi. RRR, clear distinct S1, S2, no S3, S4, murmurs, gallops, or rubs. Abdomen soft, non-tender, no rebound or guarding. No CVAT b/l. No edema or tenderness of the lower extremities.

## 2018-09-02 NOTE — CONSULT NOTE ADULT - SUBJECTIVE AND OBJECTIVE BOX
**********              CARDIOLOGY CONSULT NOTE              ************  ============================================================  CHIEF COMPLAINT/REASON FOR CONSULT:  Patient is a 63y old  Male who presents with a chief complaint of     HISTORY OF PRESENT ILLNESS:  63yMale with a history of  presenting with        ============================================================  Interval Events (NA)  -   -     ============================================================    To Delete  PAST MEDICAL & SURGICAL HISTORY:  DVT (deep venous thrombosis): LLE; diagnosed January 2016, s/p 6 months anticoagulation  Hyperlipidemia  Hypertension  History of keloid of skin: removal of keloid from scalp      Allergies    No Known Allergies    Intolerances    	    MEDICATIONS:  apixaban 2.5 milliGRAM(s) Oral every 12 hours  NIFEdipine IR 30 milliGRAM(s) Oral two times a day            atorvastatin 80 milliGRAM(s) Oral at bedtime        PAST MEDICAL & SURGICAL HISTORY:  DVT (deep venous thrombosis): LLE; diagnosed January 2016, s/p 6 months anticoagulation  Hyperlipidemia  Hypertension  History of keloid of skin: removal of keloid from scalp      FAMILY HISTORY:  Family history of leukemia (Mother)  Family history of brain aneurysm (Sibling)    Mother - HTN  Father - DM    SOCIAL HISTORY:    [ x] Non-smoker  [x] No Illicit Drug Use  [ x] No Excess EtOH Use      REVIEW OF SYSTEMS: (Unless + Before Symptom, it is negative)  Constitutional: No Fever, Fatigue, Weight Changes  Eyes: No Recent Vision Changes, Eye Pain  ENT: No Congestion, Sore Throat  Endocrine: No Excess Sweating, Temperature Intolerance  Cardiovascular: No Chest Pain, Palpitations, Shortness of Breath, Pre-syncope, Syncope, LE Edema  Respiratory: No Cough, Congestion, Wheezing  Gastrointestinal: No Abdominal Pain, Nausea, Vomiting  Genitourinary: No dysuria, hematuria  Musculoskeletal: No Joint Pain, Swelling  Neurologic: No headaches, Imbalance, Weakness  Skin: No rashes, hematoma, purprura    ================================    PHYSICAL EXAM:  T(C): 36.5 (09-02-18 @ 13:10), Max: 36.9 (09-01-18 @ 20:46)  HR: 66 (09-02-18 @ 13:10) (63 - 92)  BP: 117/71 (09-02-18 @ 13:10) (108/71 - 132/72)  RR: 18 (09-02-18 @ 13:10) (16 - 18)  SpO2: 97% (09-02-18 @ 13:10) (95% - 100%)  Wt(kg): --  I&O's Summary    Appearance: Normal; NAD	  HEENT:   Normal oral mucosa, EOMI	  Lymphatic: No lymphadenopathy  Cardiovascular: Normal S1 S2, No JVD, No murmurs, No edema  Respiratory: Lungs clear to auscultation, no use of accessory muscles	  Psychiatry: A & O x 3, Mood & affect appropriate  Gastrointestinal:  Soft, Non-tender	  Skin: No rashes, No ecchymoses, No cyanosis	  Neurologic: Non-focal, No Focal Deficits  Extremities: Normal range of motion, No clubbing, cyanosis or edema  Vascular: Peripheral pulses palpable 2+ bilaterally, no prominent varicosities    ============================    LABS:	   Labs [id number] @ 14:00	    CBC Full  -  ( 01 Sep 2018 17:54 )  WBC Count : 6.2 K/uL  Hemoglobin : 14.6 g/dL  Hematocrit : 44.9 %  Platelet Count - Automated : 198 K/uL  Mean Cell Volume : 84.5 fl  Mean Cell Hemoglobin : 27.5 pg  Mean Cell Hemoglobin Concentration : 32.6 gm/dL  Auto Neutrophil # : 3.0 K/uL  Auto Lymphocyte # : 2.4 K/uL  Auto Monocyte # : 0.6 K/uL  Auto Eosinophil # : 0.2 K/uL  Auto Basophil # : 0.0 K/uL  Auto Neutrophil % : 48.1 %  Auto Lymphocyte % : 39.4 %  Auto Monocyte % : 9.2 %  Auto Eosinophil % : 2.6 %  Auto Basophil % : 0.7 %    09-01    142  |  105  |  15  ----------------------------<  92  3.7   |  25  |  1.24    Ca    9.4      01 Sep 2018 17:54    TPro  7.1  /  Alb  4.4  /  TBili  0.4  /  DBili  x   /  AST  25  /  ALT  19  /  AlkPhos  77  09-01        =========================================================================  CXR:  Clear    ECG:  	  NSR no ischemic changes    =========================================================================  ASSESSMENT:    MEDICATIONS  (STANDING):  apixaban 2.5 milliGRAM(s) Oral every 12 hours  atorvastatin 80 milliGRAM(s) Oral at bedtime  NIFEdipine IR 30 milliGRAM(s) Oral two times a day    MEDICATIONS  (PRN):      Recommendations  -   -   -   -   -   - Thank you for this consult.        Please call with questions.     Moi Pedro MD, [facility]  [phone]                                  Total time spent in face-to-face encounter was 80 minutes. > 50 minutes spent in counseling and coordination of care addressing all medical issues listed above. All labs, imaging, consultant reports, and any relevant outside medical records personally reviewed in order to evaluate and manage the patient medically as well as provide coordination of care amongst providers. **********              CARDIOLOGY CONSULT NOTE              ************  ============================================================  CHIEF COMPLAINT/REASON FOR CONSULT:  Patient is a 63y old  Male who presents with a chief complaint of     HISTORY OF PRESENT ILLNESS:  63yMale with a history of Recent Divorce After 30 years of Marriage, HTN, HL, DVT (2016 s/p 6 months anticoagulation) -  presenting with chest discomfort.   Describes chest discomfort as chronic; ; occurs with breathing; band-like, not painful 0/10, no radiation, no N/V/Palps/Diaphoresis. Not worse with exertion. Feels like cannot take a deep breath.   Also describes significant anxiety over recent divorce.   EKG with LVH no ischemic changes.   Stress MPI read by me; almost entirely corrects with prone imaging, except for a minimal area in the basoinferolateral wall.         Allergies    No Known Allergies    Intolerances    	    MEDICATIONS:  apixaban 2.5 milliGRAM(s) Oral every 12 hours  NIFEdipine IR 30 milliGRAM(s) Oral two times a day            atorvastatin 80 milliGRAM(s) Oral at bedtime        PAST MEDICAL & SURGICAL HISTORY:  DVT (deep venous thrombosis): LLE; diagnosed 2016, s/p 6 months anticoagulation  Hyperlipidemia  Hypertension  History of keloid of skin: removal of keloid from scalp      FAMILY HISTORY:  Family history of leukemia (Mother)  Family history of brain aneurysm (Sibling)    Mother - HTN  Father - DM    SOCIAL HISTORY:    [ x] Non-smoker  [x] No Illicit Drug Use  [ x] No Excess EtOH Use      REVIEW OF SYSTEMS: (Unless + Before Symptom, it is negative)  Constitutional: No Fever, Fatigue, Weight Changes +Anxiety  Eyes: No Recent Vision Changes, Eye Pain  ENT: No Congestion, Sore Throat  Endocrine: No Excess Sweating, Temperature Intolerance  Cardiovascular: +Chest discomfort, Palpitations, Shortness of Breath, Pre-syncope, Syncope, LE Edema  Respiratory: No Cough, Congestion, Wheezing  Gastrointestinal: No Abdominal Pain, Nausea, Vomiting  Genitourinary: No dysuria, hematuria  Musculoskeletal: No Joint Pain, Swelling  Neurologic: No headaches, Imbalance, Weakness  Skin: No rashes, hematoma, purprura    ================================    PHYSICAL EXAM:  T(C): 36.5 (18 @ 13:10), Max: 36.9 (18 @ 20:46)  HR: 66 (18 @ 13:10) (63 - 92)  BP: 117/71 (18 @ 13:10) (108/71 - 132/72)  RR: 18 (18 @ 13:10) (16 - 18)  SpO2: 97% (18 @ 13:10) (95% - 100%)  Wt(kg): --  I&O's Summary    Appearance: Normal; NAD	  HEENT:   Normal oral mucosa, EOMI	  Lymphatic: No lymphadenopathy  Cardiovascular: Normal S1 S2, No JVD, No murmurs, No edema  Respiratory: Lungs clear to auscultation, no use of accessory muscles	  Psychiatry: A & O x 3, Mood & affect appropriate  Gastrointestinal:  Soft, Non-tender	  Skin: No rashes, No ecchymoses, No cyanosis	  Neurologic: Non-focal, No Focal Deficits  Extremities: Normal range of motion, No clubbing, cyanosis or edema  Vascular: Peripheral pulses palpable 2+ bilaterally, no prominent varicosities    ============================    LABS:	   Labs [id number] @ 14:00	    CBC Full  -  ( 01 Sep 2018 17:54 )  WBC Count : 6.2 K/uL  Hemoglobin : 14.6 g/dL  Hematocrit : 44.9 %  Platelet Count - Automated : 198 K/uL  Mean Cell Volume : 84.5 fl  Mean Cell Hemoglobin : 27.5 pg  Mean Cell Hemoglobin Concentration : 32.6 gm/dL  Auto Neutrophil # : 3.0 K/uL  Auto Lymphocyte # : 2.4 K/uL  Auto Monocyte # : 0.6 K/uL  Auto Eosinophil # : 0.2 K/uL  Auto Basophil # : 0.0 K/uL  Auto Neutrophil % : 48.1 %  Auto Lymphocyte % : 39.4 %  Auto Monocyte % : 9.2 %  Auto Eosinophil % : 2.6 %  Auto Basophil % : 0.7 %    09-01    142  |  105  |  15  ----------------------------<  92  3.7   |  25  |  1.24    Ca    9.4      01 Sep 2018 17:54    TPro  7.1  /  Alb  4.4  /  TBili  0.4  /  DBili  x   /  AST  25  /  ALT  19  /  AlkPhos  77          =========================================================================  CXR:  Clear    ECG:  	  NSR +LVH no ischemic changes      < from: Nuclear Stress Test-Exercise (18 @ 09:57) >  PATIENT: Asad Easton  : 1955   AGE: 63 (M)   MR#: [id number]  STUDY DATE: 2018  LOCATION: O/P  REF. PHYSICIAN(S): Glenroy Gale MD  FELLOW: Jia JEAN-BAPTISTE NP  ------------------------------------------------------------------------  TYPE OF TEST: Rest/Stress SESTAMIBI  INDICATION: Other chest pain (R07.89)  ------------------------------------------------------------------------  HISTORY:  CARDIAC HISTORY: 63 years old male with history of DVT X2  on Eliquispresents for ischemic evaluation of  intermittent chest pressure over one week.  RISK FACTORS: Elevated Cholesterol, Hypertension  MEDICATIONS: Eliquis, Lipitor, Procardia  ------------------------------------------------------------------------  BASELINE ELECTROCARDIOGRAM:  Rhythm: Sinus Rhythm - Rare VPD's - 71 BPM  ST: Nonspecific ST-T wave abnormality in III.  Other: Left ventricular hypertrophy  ------------------------------------------------------------------------  EXERCISE RESULTS:  TIME     SPEED    GRADE  HR      BP  Baseline  Standing   N/A  71  126/87  03:00     1.7 MPH    10% 101  130/86  05:30     2.5 MPH    12% 139  156/70  01:00     Recovery       126 147/107  03:00     Recovery        85  140/81  05:00     Recovery        87  131/80  ------------------------------------------------------------------------  Protocol: Yuval   Exercise Duration: 5: 30 Min  HR: Baseline HR: 71 bpm   Peak HR: 139 bpm (89% of MPHR)  MPHR: 157 bpm   85% of MPHR: 133 bpm  BP: Baseline BP: 126/87 mmHg   Peak BP: 156/70 mmHg   Peak  RPP: 84089 (Rate Pressure Product)  Last Caffeine intake: 12 hrs  Terminated: Fatigue  Performance: 7 METS, Poor for age and gender.  ------------------------------------------------------------------------  SYMPTOMS/FINDINGS:  Symptoms: Dyspnea  Chest pain: No chest pain with exercise  ------------------------------------------------------------------------  ECG ABNORMALITIES DURING/AFTER STRESS:   ST Changes:ST Depression: 0.5 mm upsloping in leads V5,  V6 started at 05:30 min of exercise at HR of 139 and  persisted 01:00 min into recovery.  ------------------------------------------------------------------------  NEW ARRHYTHMIAS DEVELOPED DURING/AFTER STRESS:  Rare VPDs occurred during rest, stress and recovery.  ------------------------------------------------------------------------  STRESS TEST IMPRESSIONS:  Exercise capacity: 7 METS, Poor for age and gender.  Chest Pain: No chest pain with exercise.  Symptom: Dyspnea.  HR Response: Appropriate.  BP Response: Appropriate.  Heart Rhythm: Sinus Rhythm - Rare VPD's - 71 BPM.  Baseline ECG: Nonspecific ST-T wave abnormality in III.  ECG Changes: ST Depression: 1 mm upsloping in leads V5, V6  started at 05:00 min of exercise at HR of 139 and  persisted 05:00 min into recovery.  Arrhythmia: Rare VPDs occurred during rest, stress and  recovery.  ------------------------------------------------------------------------  PROCEDURE:  6.5 mCi of Tc99m Sestamibi were injected intravenously at  rest. Approximately 60 minutes later, tomographic images  were obtained in a 180 degree arc from right anterior  oblique to left anterior oblique with 64 stops. At a  separate time, 17.8 mCi of Tc99m Sestamibi were injected  intravenously during stress protocol. Approximately 35  minute(s) later, tomographic images were obtained in a 180  degree arc from right anterior oblique to left anterior  oblique with 64 stops. The tomographic slices were  reconstructed in 3 orthogonal planes (short axis,  horizontal long axis and vertical long axis).  Interpretation was performed both by visual and  quantitative analysis.  Images were re-acquired with the patient in a prone  position.  Rest and stress images were acquired and processed using  [employer]. Cardiac wide beam reconstruction software  ([employer]Êat default resolution.  ------------------------------------------------------------------------  NUCLEAR FINDINGS:  There are small-sized mild to moderate fixed defects in  the mid to basal anteroseptum and inferoapical walls.  There is a medium-sized, moderate fixed defect in the mid  to basal inferolateral wall. With prone imaging, the  anteroseptum and inferoapical walls correct; the  inferolateral wall predominantly corrects, but there  remains a mild, fixed defect.  ------------------------------------------------------------------------  GATED ANALYSIS:  Post-stress gated wall motion analysis was performed (LVEF  = 47 %;LVEDV = 131 ml.), revealing mild hypokinesis in  basal infero-lateral wall(s).  ------------------------------------------------------------------------  IMPRESSIONS:Mildly Abnormal Study  * Exercise capacity: 7 METS, Poor for age and gender.  * Chest Pain: No chest pain with exercise.  * Symptom: Dyspnea.  * HR Response: Appropriate.  * BP Response: Appropriate.  * Heart Rhythm: Sinus Rhythm - Rare VPD's - 71 BPM.  * Baseline ECG: Nonspecific ST-T wave abnormality in III.  * ECG Changes: ST Depression: 1 mm upsloping in leads V5,  V6 started at 05:00 min of exercise at HR of 139 and  persisted 05:00 min into recovery.  * Arrhythmia: Rare VPDs occurred during rest, stress and  recovery.  * There are small-sized mild to moderate fixed defects in  the mid to basal anteroseptum and inferoapical walls.  There is a medium-sized, moderate fixed defect in the mid  to basal inferolateral wall. With prone imaging, the  anteroseptum and inferoapical walls correct; the  inferolateral wall predominantly corrects, but there  remains a mild, fixed defect.  * Post-stress gated wall motion analysis was performed  (LVEF = 47 %;LVEDV = 131 ml.), revealing mild hypokinesis  in basal infero-lateral wall(s).  Conclusion:  There are small-sized mild to moderate fixed defects in  the mid to basal anteroseptum and inferoapical walls.  There is a medium-sized, moderate fixed defect in the mid  to basal inferolateral wall. With prone imaging, the  anteroseptum and inferoapical walls correct; the  inferolateral wall predominantly corrects, but there  remains a mild, fixed defect, which may suggest infarct.  ------------------------------------------------------------------------  Confirmed on  2018 - 11:22:08 by Moi Pedro M.D.  ------------------------------------------------------------------------    < end of copied text >      =========================================================================  ASSESSMENT:  Atypical chest discomfort. Likely soft tissue attenuation, but could not rule out infarct. Patient feels it is due to anxiety.     Recommendations  - Advised to return if new or worsening symptoms  - F/U with cardiology in 1-2 wks  - Counseling on divorce and stress  - Consider CT coronary as outpatient  - Stable for D/C home from cardiac standpoint at this time  - Thank you for this consult.        Please call with questions.     Moi Pedro MD, [facility]  [phone]                                  Total time spent in face-to-face encounter was 80 minutes. > 50 minutes spent in counseling and coordination of care addressing all medical issues listed above. All labs, imaging, consultant reports, and any relevant outside medical records personally reviewed in order to evaluate and manage the patient medically as well as provide coordination of care amongst providers.

## 2018-09-02 NOTE — ED CDU PROVIDER DISPOSITION NOTE - PLAN OF CARE
1. Follow up with your PMD and/or cardiologist within 48-72 hours. If you do not have a cardiologist call [phone] to make an appointment   2. Show copies of your reports given to you. Take all of your other medications as previously prescribed.   3. Worsening or continued chest pain, shortness of breath, weakness, return to ED. 1. Follow up with your PMD and/or cardiologist within 48-72 hours. If you do not have a cardiologist call [phone] to make an appointment   2. Show copies of your reports given to you. Take all of your other medications as previously prescribed.   3. Return to ER if you experience worsening or continued chest pain, shortness of breath, weakness, palpitations, neck/jaw/arm pain, passing out, or if you have any other concerns.

## 2018-09-02 NOTE — ED CDU PROVIDER SUBSEQUENT DAY NOTE - HISTORY
CDU NOTE STELLA Mckeon: VSS NAD. Patient is resting comfortably. No interval change since arrival to CDU. No acute events on tele. Pending Nuc Stress in am

## 2018-09-02 NOTE — ED CDU PROVIDER DISPOSITION NOTE - CLINICAL COURSE
62 y/o M with pmhx of DVT of left leg on Eliquis, hyperlipidemia, HTN, and pshx of removal of skin keloid presents with non radiating generalized chest tightness x1 week, worsening today around 2 PM so decided to come to ED. Chest discomfort is worse with exertion. No hx of heart attack. Pt had stress test a year and a half go, no remarkable findings. Denies nausea/ SOB or any other complaints at this time.    In ED patient labs wnl, trop 8 w/ delta trop 7. Normal CXR. Patient sent to CDU for further work-up. Patient with uneventful overnight in CDU. Stress resulted as 62 y/o M with pmhx of DVT of left leg on Eliquis, hyperlipidemia, HTN, and pshx of removal of skin keloid presents with non radiating generalized chest tightness x1 week, worsening today around 2 PM so decided to come to ED. Chest discomfort is worse with exertion. No hx of heart attack. Pt had stress test a year and a half go, no remarkable findings. Denies nausea/ SOB or any other complaints at this time.  In ED patient labs wnl, trop 8 w/ delta trop 7. Normal CXR. Patient sent to CDU for further work-up. Patient with uneventful overnight in CDU. Nuc stress test showed: ECG Changes: ST Depression: 1 mm upsloping in leads V5, V6 started at 05:00 min of exercise at HR of 139 and persisted 05:00 min into recovery.  (LVEF = 47 %;LVEDV = 131 ml.), revealing mild hypokinesis in basal infero-lateral wall(s).  There are small-sized mild to moderate fixed defects in the mid to basal anteroseptum and inferoapical walls. There is a medium-sized, moderate fixed defect in the mid to basal inferolateral wall. With prone imaging, the anteroseptum and inferoapical walls correct; the inferolateral wall predominantly corrects, but there remains a mild, fixed defect, which may suggest infarct.  Pt evaluated at bedside by cardiology, Dr. Neri Hector, who recommends DC with non-emergent outpatient cardiology f/u. Discussed plan with pt. All questions answered. d/w Dr. Beaver, pt seen by attending. Pt stable and ready for DC.

## 2018-09-02 NOTE — ED CDU PROVIDER DISPOSITION NOTE - ATTENDING CONTRIBUTION TO CARE
I have personally performed a face to face diagnostic evaluation on this patient.  I have reviewed the ACP note and agree with the history, exam, and plan of care, except as noted.  History and Exam by me shows  See by cards attending, stress result felt to be likely attenuation artifact stable for opt cards follow up  John Beaver MD, [facility]

## 2019-04-19 ENCOUNTER — TRANSCRIPTION ENCOUNTER (OUTPATIENT)
Age: 64
End: 2019-04-19

## 2019-09-10 ENCOUNTER — EMERGENCY (EMERGENCY)
Facility: HOSPITAL | Age: 64
LOS: 1 days | Discharge: HOME | End: 2019-09-10
Attending: STUDENT IN AN ORGANIZED HEALTH CARE EDUCATION/TRAINING PROGRAM
Payer: COMMERCIAL

## 2019-09-10 VITALS
HEART RATE: 73 BPM | RESPIRATION RATE: 18 BRPM | SYSTOLIC BLOOD PRESSURE: 118 MMHG | OXYGEN SATURATION: 99 % | TEMPERATURE: 98 F | DIASTOLIC BLOOD PRESSURE: 73 MMHG

## 2019-09-10 VITALS
HEIGHT: 70 IN | HEART RATE: 80 BPM | TEMPERATURE: 98 F | OXYGEN SATURATION: 97 % | DIASTOLIC BLOOD PRESSURE: 71 MMHG | RESPIRATION RATE: 20 BRPM | WEIGHT: 225.09 LBS | SYSTOLIC BLOOD PRESSURE: 114 MMHG

## 2019-09-10 DIAGNOSIS — Z87.2 PERSONAL HISTORY OF DISEASES OF THE SKIN AND SUBCUTANEOUS TISSUE: Chronic | ICD-10-CM

## 2019-09-10 PROCEDURE — 99284 EMERGENCY DEPT VISIT MOD MDM: CPT | Mod: 25

## 2019-09-10 PROCEDURE — 93971 EXTREMITY STUDY: CPT | Mod: 26

## 2019-09-10 PROCEDURE — 93971 EXTREMITY STUDY: CPT

## 2019-09-10 PROCEDURE — 99284 EMERGENCY DEPT VISIT MOD MDM: CPT

## 2019-09-10 NOTE — ED PROVIDER NOTE - PROGRESS NOTE DETAILS
US negative for DVT. patient denies symptoms right now. recommend f/u with pmd and vascular surgeon.  patient comfortable with discharge and strict return precautions

## 2019-09-10 NOTE — ED PROVIDER NOTE - OBJECTIVE STATEMENT
64 yom pmh DVT on elliquis, HTN, HLD here with 1 month of numbness and calf spasm. Reports symptoms are brought on by walking and relieved with rest. Reports mild swelling in same leg. Denies any sympomts in right leg. Denies fevers, chills, cp, sob, n/v, dizziness, abd pain, weakness 64 yom pmh DVT on elliquis, HTN, HLD here with 1 month of numbness and calf spasm. Reports symptoms are brought on by walking and relieved with rest. Reports mild swelling in same leg. Denies any symptoms in right leg. Denies fevers, chills, cp, sob, n/v, dizziness, abd pain, weakness

## 2019-09-10 NOTE — ED STATDOCS - OBJECTIVE STATEMENT
64 year old male with pmhx DVT, hyperlipidemia, hypertension and pshx keloid of skin presents to the ED c/o left leg numbness x1 month while walking. +tingling in heel and toes. Tingling radiates from foot toward knee, but pt does not note pain. The tingling is present at rest but numbness onsets while walking. Pt has had two blood clots in the same leg, with similar symptoms but pt does not have any swelling to the lower extremities currently. Denies back pain, dysuria, fecal incontinence. 64 year old male with pmhx DVT, hyperlipidemia, hypertension  presents to the ED c/o left leg numbness x1 month while walking. +tingling in heel and toes. Tingling radiates from foot toward knee, but pt does not note pain. The tingling is present at rest but worsens with walking. Pt has had two blood clots in the same leg but states that he had pain and swelling of the leg at that time. Denies back pain, or urinary/bowel changes

## 2019-09-10 NOTE — ED PROVIDER NOTE - CARE PROVIDER_API CALL
Balwinder Stewart)  Vascular Surgery  [address], [address]  [place] [address]  Phone: [phone]  Fax: [phone]  Follow Up Time:

## 2019-09-10 NOTE — ED PROVIDER NOTE - CLINICAL SUMMARY MEDICAL DECISION MAKING FREE TEXT BOX
DVT history on elliquis. 1 month of calf pain/spasm. will get US to r/o DVT. also possible claudication component, will need close vascular f/u

## 2019-09-10 NOTE — ED PROVIDER NOTE - NS ED ROS FT
ROS:  GEN: (-) fevers/chills, (-) weight loss, (-) fatigue/malaise  HEENT: (-) visual change, (-) nasal congestion/rhinorrhea, (-) difficulty swallowing  NECK: (-) stiffness, (-) swelling  RESP: (-) shortness of breath, (-) cough, (-) sputum, (-) hemoptysis, (-) BEST  CV: (-) chest pain, (-) palpitations, (-) PND/orthopnea  GI: (-) nausea, (-) vomiting, (-) pain, (-) constipation, (-) diarrhea, (-) melena, (-) BRBPR  : (-) frequency/urgency, (-) hematuria, (-) dysuria, (-) incontinence, (-) discharge  EXT: (+) edema, (-) cyanosis  ENDO: (-) heat/cold intolerance, (-) polyuria  NEURO: (-) paresthesias, (-) weakness, (-) headache, (-) dizziness, (-) syncope  Psych: (-) depression, (-) anxiety, (-) SI, (-) HI, (-) AH, (-) VH  MSK: no recent trauma, (+) muscle pain

## 2019-09-10 NOTE — ED PROVIDER NOTE - PATIENT PORTAL LINK FT
You can access the FollowMyHealth Patient Portal offered by [facility] by registering at the following website: [email or web address]. By joining [facility]’s FollowMyHealth portal, you will also be able to view your health information using other applications (apps) compatible with our system.

## 2019-09-10 NOTE — ED PROVIDER NOTE - PHYSICAL EXAMINATION
Vital Signs Stable  Gen: well appearing, NAD  HEENT: MMM, neck supple  Cardiac: regular rate rhythm, normal S1S2  Chest: CTA BL, no wheezes or crackles  Abdomen: normal BS, soft, non tender non distended  Extremity: no gross deformity, trace edema to left calf, non tender, 2+ DP  Skin: no rash  Neuro: grossly normal

## 2019-09-10 NOTE — ED PROVIDER NOTE - NSFOLLOWUPINSTRUCTIONS_ED_ALL_ED_FT
Continue all your home medications. Follow up with your primary care doctor and vascular surgeon (Dr. Stewart) within 1 week. Return for any concerning or worsening symptoms

## 2020-02-11 NOTE — ED CDU PROVIDER SUBSEQUENT DAY NOTE - DATE/TIME 4
Group Therapy Documentation    PATIENT'S NAME: Durga Aguirre  MRN:   [id number]  :   1958  ACCT. NUMBER: [id number]  DATE OF SERVICE: 2/10/20  START TIME:  5:30 PM  END TIME:  7:30 PM  FACILITATOR(S): Inga Guardado; Marquis Quintero, [facility]  TOPIC: BEH Group Therapy  Number of patients attending the group:  8  Group Length:  2 Hours    Group Therapy Type: Addiction    Summary of Group / Topics Discussed:    Client participated in check-in process.  Client participated in guided deep breathing exercise.  Client participated in a group exercise to discover personal strengths.  Client categorized strengths identified into those most like them, least like them, or maybe like them.        Group Attendance:  Attended group session    Patient's response to the group topic/interactions:  cooperative with task, discussed personal experience with topic and expressed readiness to alter behaviors    Patient appeared to be Actively participating.        Client specific details:  Client participated in the check in process.  Client participated in group deep breathing exercise which he found very helpful.  Client completed the strengths activity, and shared at length about how he feels good about who he is as a result of this exercise.  
02-Sep-2018 11:51

## 2021-04-26 ENCOUNTER — APPOINTMENT (OUTPATIENT)
Dept: UROLOGY | Facility: CLINIC | Age: 66
End: 2021-04-26
Payer: COMMERCIAL

## 2021-04-26 VITALS
HEART RATE: 72 BPM | RESPIRATION RATE: 17 BRPM | WEIGHT: 225 LBS | BODY MASS INDEX: 32.21 KG/M2 | TEMPERATURE: 98 F | HEIGHT: 70 IN | SYSTOLIC BLOOD PRESSURE: 117 MMHG | DIASTOLIC BLOOD PRESSURE: 76 MMHG

## 2021-04-26 DIAGNOSIS — R31.29 OTHER MICROSCOPIC HEMATURIA: ICD-10-CM

## 2021-04-26 DIAGNOSIS — R35.1 BENIGN PROSTATIC HYPERPLASIA WITH LOWER URINARY TRACT SYMPMS: ICD-10-CM

## 2021-04-26 DIAGNOSIS — N40.1 BENIGN PROSTATIC HYPERPLASIA WITH LOWER URINARY TRACT SYMPMS: ICD-10-CM

## 2021-04-26 LAB
APPEARANCE: CLEAR
BACTERIA: NEGATIVE
BILIRUBIN URINE: NEGATIVE
BLOOD URINE: NEGATIVE
COLOR: YELLOW
GLUCOSE QUALITATIVE U: NEGATIVE
HYALINE CASTS: 0 /LPF
KETONES URINE: NEGATIVE
LEUKOCYTE ESTERASE URINE: NEGATIVE
MICROSCOPIC-UA: NORMAL
NITRITE URINE: NEGATIVE
PH URINE: 5.5
PROTEIN URINE: NORMAL
RED BLOOD CELLS URINE: 0 /HPF
SPECIFIC GRAVITY URINE: 1.03
SQUAMOUS EPITHELIAL CELLS: 1 /HPF
UROBILINOGEN URINE: NORMAL
WHITE BLOOD CELLS URINE: 4 /HPF

## 2021-04-26 PROCEDURE — 99204 OFFICE O/P NEW MOD 45 MIN: CPT

## 2021-04-26 PROCEDURE — 99072 ADDL SUPL MATRL&STAF TM PHE: CPT

## 2021-04-26 NOTE — PHYSICAL EXAM
[General Appearance - Well Developed] : well developed [Edema] : no peripheral edema [] : no respiratory distress [Abdomen Soft] : soft [Normal Station and Gait] : the gait and station were normal for the patient's age [Skin Color & Pigmentation] : normal skin color and pigmentation [Sensation] : the sensory exam was normal to light touch and pinprick [Oriented To Time, Place, And Person] : oriented to person, place, and time [Urethral Meatus] : meatus normal [Urinary Bladder Findings] : the bladder was normal on palpation [Scrotum] : the scrotum was normal [Epididymis] : the epididymides were normal [Testes Tenderness] : no tenderness of the testes [Testes Mass (___cm)] : there were no testicular masses [Prostate Tenderness] : the prostate was not tender [No Prostate Nodules] : no prostate nodules [Affect] : the affect was normal [Mood] : the mood was normal [Not Anxious] : not anxious [Inguinal Lymph Nodes Enlarged Bilaterally] : inguinal

## 2021-04-26 NOTE — LETTER BODY
[Dear  ___] : Dear  [unfilled], [Consult Letter:] : I had the pleasure of evaluating your patient, [unfilled]. [Please see my note below.] : Please see my note below. [Consult Closing:] : Thank you very much for allowing me to participate in the care of this patient.  If you have any questions, please do not hesitate to contact me. [Sincerely,] : Sincerely, [FreeTextEntry2] : Dr. Dorina Jung\par [address] [facility]\[place] [address]\par Phone [phone]\par Fax [phone] [FreeTextEntry3] : Francoise Olguin M.D.\par

## 2021-04-26 NOTE — HISTORY OF PRESENT ILLNESS
[None] : no symptoms [FreeTextEntry1] : 66 yr old male presents to establish care. Pt denies any urinary symptoms. Pt states that PCP Dr. Jung referred him. Pt saw a urologist 5 years ago for blood in the urine (microhematuria). He also had c/o nocturia x 2-3 and was prescribed Flomax 0.4 mg- pt does not take. When seeing the previous urologist, patient refused cysto and did not complete hematuria workup. \par \par No family hx of prostate cancer\par \par Patient now reports nocturia x 0-1 per night. Reports good flow, no incontinence, no hesitancy or frequency\par \par PSA (per verbal report from Dr. Dorina Jung)\par 2.56- 3/22/21\par 2.88 - 1/21\par 2.0 - 2016\par \par Surgical hx: none\par Medical hx: HTN, HLD, Left DVT \par Allergies: NKDA\par Social: Alcohol- occasionally, Smoking- cigar once a month, Drug- none, Occupation- Computer work \par Family hx: Mother- Leukemia \par Medications: rosuvastatin 20 mg, Eliquis 2.5 mg, Losartan, Vit D\par \par

## 2021-04-27 LAB — URINE CYTOLOGY: NORMAL

## 2021-04-29 LAB — BACTERIA UR CULT: ABNORMAL

## 2021-05-24 ENCOUNTER — OUTPATIENT (OUTPATIENT)
Dept: OUTPATIENT SERVICES | Facility: HOSPITAL | Age: 66
LOS: 1 days | End: 2021-05-24
Payer: COMMERCIAL

## 2021-05-24 ENCOUNTER — APPOINTMENT (OUTPATIENT)
Dept: CT IMAGING | Facility: IMAGING CENTER | Age: 66
End: 2021-05-24
Payer: COMMERCIAL

## 2021-05-24 DIAGNOSIS — R31.29 OTHER MICROSCOPIC HEMATURIA: ICD-10-CM

## 2021-05-24 DIAGNOSIS — Z87.2 PERSONAL HISTORY OF DISEASES OF THE SKIN AND SUBCUTANEOUS TISSUE: Chronic | ICD-10-CM

## 2021-05-24 DIAGNOSIS — N40.1 BENIGN PROSTATIC HYPERPLASIA WITH LOWER URINARY TRACT SYMPTOMS: ICD-10-CM

## 2021-05-24 DIAGNOSIS — Z00.8 ENCOUNTER FOR OTHER GENERAL EXAMINATION: ICD-10-CM

## 2021-05-24 PROCEDURE — 74178 CT ABD&PLV WO CNTR FLWD CNTR: CPT | Mod: 26

## 2021-05-24 PROCEDURE — 82565 ASSAY OF CREATININE: CPT

## 2021-05-24 PROCEDURE — 74178 CT ABD&PLV WO CNTR FLWD CNTR: CPT

## 2021-06-22 ENCOUNTER — APPOINTMENT (OUTPATIENT)
Dept: UROLOGY | Facility: CLINIC | Age: 66
End: 2021-06-22
Payer: COMMERCIAL

## 2021-06-22 ENCOUNTER — OUTPATIENT (OUTPATIENT)
Dept: OUTPATIENT SERVICES | Facility: HOSPITAL | Age: 66
LOS: 1 days | End: 2021-06-22
Payer: COMMERCIAL

## 2021-06-22 VITALS
HEART RATE: 72 BPM | RESPIRATION RATE: 17 BRPM | DIASTOLIC BLOOD PRESSURE: 72 MMHG | TEMPERATURE: 98 F | SYSTOLIC BLOOD PRESSURE: 121 MMHG | WEIGHT: 225 LBS | BODY MASS INDEX: 32.21 KG/M2 | HEIGHT: 70 IN

## 2021-06-22 DIAGNOSIS — Z87.2 PERSONAL HISTORY OF DISEASES OF THE SKIN AND SUBCUTANEOUS TISSUE: Chronic | ICD-10-CM

## 2021-06-22 PROCEDURE — 76000 FLUOROSCOPY <1 HR PHYS/QHP: CPT

## 2021-06-22 PROCEDURE — 99072 ADDL SUPL MATRL&STAF TM PHE: CPT

## 2021-06-22 PROCEDURE — 99212 OFFICE O/P EST SF 10 MIN: CPT

## 2021-06-22 NOTE — HISTORY OF PRESENT ILLNESS
[FreeTextEntry1] : The patient is a 66 year old male presenting today for a follow up.\par He has nocturia 2 times per night and voids a normal amount during the night.  \par He reports he drinks a normal amount of water during the day.\par He reports that he was previously prescribed Tamsulosin, but it was not effective. \par \par His PVR was 0cc.\par The digital rectal exam was normal with a smooth, regular 40+g prostate. There were no nodules. \par \par The CT Urogram from 5/24/21 demonstrated:\par FINDINGS:\par KIDNEYS/URETERS: No renal stones, renal masses or evidence of a urothelial lesion.\par BLADDER: within normal limits.\par REPRODUCTIVE ORGANS: Mildly enlarged prostate gland.\par IMPRESSION:\par Interval stability.\par No hydronephrosis, stone or lesions within the collecting system.\par \par The patient had microscopic hematuria in the past, his blood test from  April showed no red blood cells in the field. \par His CT Urogram was completely normal. \par \par In view of the facts he has nocturia 2 times per night and does not respond to Tamsulosin, I recommend that we do UDS testing on him. \par \par The patient will return to the office in a week for UDS testing.

## 2021-06-22 NOTE — ASSESSMENT
[FreeTextEntry1] : The patient is a 66 year old male presenting today for a follow up.\par He has nocturia 2 times per night and voids a normal amount during the night.  \par He reports he drinks a normal amount of water during the day.\par He reports that he was previously prescribed Tamsulosin, but it was not effective. \par \par His PVR was 0cc.\par The digital rectal exam was normal with a smooth, regular 40+g prostate. There were no nodules. \par \par The CT Urogram from 5/24/21 demonstrated:\par FINDINGS:\par KIDNEYS/URETERS: No renal stones, renal masses or evidence of a urothelial lesion.\par BLADDER: within normal limits.\par REPRODUCTIVE ORGANS: Mildly enlarged prostate gland.\par IMPRESSION:\par Interval stability.\par No hydronephrosis, stone or lesions within the collecting system.\par \par The patient had microscopic hematuria in the past, his blood test from  April showed no red blood cells in the field. \par His CT Urogram was completely normal. \par \par In view of the facts he has nocturia 2 times per night and does not respond to Tamsulosin, I recommend that we do UDS testing on him. \par \par The patient will return to the office in a week for UDS testing. \par \par I spent 25 minutes with the patient.

## 2021-06-22 NOTE — PHYSICAL EXAM
[General Appearance - Well Developed] : well developed [General Appearance - Well Nourished] : well nourished [Normal Appearance] : normal appearance [Well Groomed] : well groomed [General Appearance - In No Acute Distress] : no acute distress [Abdomen Soft] : soft [Abdomen Tenderness] : non-tender [Edema] : no peripheral edema [] : no respiratory distress [Respiration, Rhythm And Depth] : normal respiratory rhythm and effort [Exaggerated Use Of Accessory Muscles For Inspiration] : no accessory muscle use [Oriented To Time, Place, And Person] : oriented to person, place, and time [Affect] : the affect was normal [Mood] : the mood was normal [Not Anxious] : not anxious [Normal Station and Gait] : the gait and station were normal for the patient's age [No Focal Deficits] : no focal deficits [No Palpable Adenopathy] : no palpable adenopathy [No Prostate Nodules] : no prostate nodules [Prostate Size ___ gm] : prostate size [unfilled] gm [FreeTextEntry1] : The digital rectal exam was normal with a smooth, regular 40+g prostate. There were no nodules.

## 2021-06-28 ENCOUNTER — OUTPATIENT (OUTPATIENT)
Dept: OUTPATIENT SERVICES | Facility: HOSPITAL | Age: 66
LOS: 1 days | End: 2021-06-28
Payer: COMMERCIAL

## 2021-06-28 ENCOUNTER — APPOINTMENT (OUTPATIENT)
Dept: UROLOGY | Facility: CLINIC | Age: 66
End: 2021-06-28
Payer: COMMERCIAL

## 2021-06-28 VITALS
SYSTOLIC BLOOD PRESSURE: 122 MMHG | TEMPERATURE: 97.8 F | DIASTOLIC BLOOD PRESSURE: 75 MMHG | HEART RATE: 96 BPM | RESPIRATION RATE: 16 BRPM

## 2021-06-28 DIAGNOSIS — Z87.2 PERSONAL HISTORY OF DISEASES OF THE SKIN AND SUBCUTANEOUS TISSUE: Chronic | ICD-10-CM

## 2021-06-28 DIAGNOSIS — R35.0 FREQUENCY OF MICTURITION: ICD-10-CM

## 2021-06-28 DIAGNOSIS — N40.1 BENIGN PROSTATIC HYPERPLASIA WITH LOWER URINARY TRACT SYMPTOMS: ICD-10-CM

## 2021-06-28 PROCEDURE — 51797 INTRAABDOMINAL PRESSURE TEST: CPT | Mod: 26

## 2021-06-28 PROCEDURE — 99072 ADDL SUPL MATRL&STAF TM PHE: CPT

## 2021-06-28 PROCEDURE — 51784 ANAL/URINARY MUSCLE STUDY: CPT

## 2021-06-28 PROCEDURE — 51728 CYSTOMETROGRAM W/VP: CPT

## 2021-06-28 PROCEDURE — 51784 ANAL/URINARY MUSCLE STUDY: CPT | Mod: 26

## 2021-06-28 PROCEDURE — 51798 US URINE CAPACITY MEASURE: CPT

## 2021-06-28 PROCEDURE — 51741 ELECTRO-UROFLOWMETRY FIRST: CPT | Mod: 26

## 2021-06-28 PROCEDURE — 99212 OFFICE O/P EST SF 10 MIN: CPT | Mod: 25

## 2021-06-28 PROCEDURE — 51797 INTRAABDOMINAL PRESSURE TEST: CPT

## 2021-06-28 PROCEDURE — 51728 CYSTOMETROGRAM W/VP: CPT | Mod: 26

## 2021-06-28 PROCEDURE — 51741 ELECTRO-UROFLOWMETRY FIRST: CPT

## 2021-06-28 NOTE — ASSESSMENT
[FreeTextEntry1] : The patient is a 66 year old male presenting today for UDS. \par He has nocturia 2 times per night. \par His PSA from 3/22/21 was 2.56. \par \par The UDS from 6/28/21 showed:\par Filling/Storage Phase: First sensation 215 mL, First desire 301 mL, Normal desire 348 mL and Cystometric capacity 363 mL. Involuntary contractions were not present . Pt did not demonstrate stress urinary incontinence. Pt did not demonstrate urge urinary incontinence. Compliance: normal. EMG Activity: normal. \par \par Voiding Phase: Qmax 14 mL/s , Pdet at Qmax 15.8 cm/H20, Qmax at Pdet 2.7 mL/s, Pavg 43 cm/H20, Qavg 2.9 mL/s, voiding time 4:56 mm:sec, voided volume 462 mL and post void residual 0 mL. EMG activity was synergic. \par \par Urodynamic Interpretation : \par Normal bladder sensation. Normal bladder capacity. Patient experiencing no detrusor instability. The uroflow rate is decreased. The uroflow pattern is plateaued. The detrusor contractility is normal. The patient has bladder outlet obstruction. The intravesical voiding pressure is increased. The patient has complete bladder emptying. The spincter activity is normal synergic. \par \par I advised the patient that his enlarged prostate is obstructing his urination. \par I told him about the various different treatments for BPH. \par I am prescribing Rapaflo 8mg, one tablet in the evening. I advised the patient of the side effects of lightheadedness and retrograde ejaculation. \par \par The patient should return to the office in one month. \par \par I spent 15 minutes with the patient.

## 2021-06-28 NOTE — PHYSICAL EXAM
[General Appearance - Well Developed] : well developed [General Appearance - Well Nourished] : well nourished [Normal Appearance] : normal appearance [Well Groomed] : well groomed [General Appearance - In No Acute Distress] : no acute distress [Abdomen Soft] : soft [Abdomen Tenderness] : non-tender [Edema] : no peripheral edema [] : no respiratory distress [Respiration, Rhythm And Depth] : normal respiratory rhythm and effort [Exaggerated Use Of Accessory Muscles For Inspiration] : no accessory muscle use [Oriented To Time, Place, And Person] : oriented to person, place, and time [Affect] : the affect was normal [Mood] : the mood was normal [Not Anxious] : not anxious [Normal Station and Gait] : the gait and station were normal for the patient's age [No Focal Deficits] : no focal deficits [No Palpable Adenopathy] : no palpable adenopathy

## 2021-06-28 NOTE — HISTORY OF PRESENT ILLNESS
[FreeTextEntry1] : The patient is a 66 year old male presenting today for UDS. \par He has nocturia 2 times per night. \par His PSA from 3/22/21 was 2.56. \par \par The UDS from 6/28/21 showed:\par Filling/Storage Phase: First sensation 215 mL, First desire 301 mL, Normal desire 348 mL and Cystometric capacity 363 mL. Involuntary contractions were not present . Pt did not demonstrate stress urinary incontinence. Pt did not demonstrate urge urinary incontinence. Compliance: normal. EMG Activity: normal. \par \par Voiding Phase: Qmax 14 mL/s , Pdet at Qmax 15.8 cm/H20, Qmax at Pdet 2.7 mL/s, Pavg 43 cm/H20, Qavg 2.9 mL/s, voiding time 4:56 mm:sec, voided volume 462 mL and post void residual 0 mL. EMG activity was synergic. \par \par Urodynamic Interpretation : \par Normal bladder sensation. Normal bladder capacity. Patient experiencing no detrusor instability. The uroflow rate is decreased. The uroflow pattern is plateaued. The detrusor contractility is normal. The patient has bladder outlet obstruction. The intravesical voiding pressure is increased. The patient has complete bladder emptying. The spincter activity is normal synergic. \par \par I advised the patient that his enlarged prostate is obstructing his urination. \par I told him about the various different treatments for BPH. \par I am prescribing Rapaflo 8mg, one tablet in the evening. I advised the patient of the side effects of lightheadedness and retrograde ejaculation. \par \par The patient should return to the office in one month.

## 2021-06-29 ENCOUNTER — RX RENEWAL (OUTPATIENT)
Age: 66
End: 2021-06-29

## 2021-07-15 DIAGNOSIS — N40.1 BENIGN PROSTATIC HYPERPLASIA WITH LOWER URINARY TRACT SYMPTOMS: ICD-10-CM

## 2021-07-19 ENCOUNTER — APPOINTMENT (OUTPATIENT)
Dept: UROLOGY | Facility: CLINIC | Age: 66
End: 2021-07-19

## 2021-07-26 ENCOUNTER — APPOINTMENT (OUTPATIENT)
Dept: UROLOGY | Facility: CLINIC | Age: 66
End: 2021-07-26

## 2021-08-02 ENCOUNTER — APPOINTMENT (OUTPATIENT)
Dept: UROLOGY | Facility: CLINIC | Age: 66
End: 2021-08-02
Payer: COMMERCIAL

## 2021-08-02 VITALS
WEIGHT: 225 LBS | RESPIRATION RATE: 17 BRPM | HEIGHT: 70 IN | BODY MASS INDEX: 32.21 KG/M2 | SYSTOLIC BLOOD PRESSURE: 119 MMHG | TEMPERATURE: 97 F | HEART RATE: 79 BPM | DIASTOLIC BLOOD PRESSURE: 73 MMHG

## 2021-08-02 PROCEDURE — 99212 OFFICE O/P EST SF 10 MIN: CPT

## 2021-08-02 NOTE — HISTORY OF PRESENT ILLNESS
[FreeTextEntry1] : The patient is a 66 year old male presenting today for a follow up for BPH. \par He denies feelings of incomplete emptying. \par He notes nocturia occasionally once per night with a good stream.\par PSA from 3/22/21 was 2.56.\par He is doing well. \par He is currently taking Rapaflo 8 mg, one capsule at bedtime. \par \par I advised him to come to the office with a full bladder for his next visit to complete the uroflow.\par \par The patient will return to the office in 6 months for uroflow study.

## 2021-08-02 NOTE — ASSESSMENT
[FreeTextEntry1] : The patient is a 66 year old male presenting today for a follow up for BPH. \par He denies feelings of incomplete emptying. \par He notes nocturia occasionally once per night with a good stream.\par PSA from 3/22/21 was 2.56.\par He is doing well. \par He is currently taking Rapaflo 8 mg, one capsule at bedtime. \par \par I advised him to come to the office with a full bladder for his next visit to complete the uroflow.\par \par The patient will return to the office in 6 months for uroflow study. \par \par I spent 15 minutes with the patient.

## 2022-01-10 ENCOUNTER — APPOINTMENT (OUTPATIENT)
Dept: UROLOGY | Facility: CLINIC | Age: 67
End: 2022-01-10

## 2022-12-23 NOTE — ED PROVIDER NOTE - NS ED NOTE AC HIGH RISK COUNTRIES
Patient is having some breakthrough bleeding, she is on OCPS and had an in office hysteroscopy on 12/13. Wanted to touch base regarding.     Returned patient call. Reviewed that can have breakthrough bleeding from OCPs and/or hysteroscopy. Patient should continue to follow FET calendar. Denies further questions.    No

## 2023-03-28 ENCOUNTER — APPOINTMENT (OUTPATIENT)
Dept: PULMONOLOGY | Facility: CLINIC | Age: 68
End: 2023-03-28
Payer: MEDICARE

## 2023-03-28 ENCOUNTER — LABORATORY RESULT (OUTPATIENT)
Age: 68
End: 2023-03-28

## 2023-03-28 ENCOUNTER — NON-APPOINTMENT (OUTPATIENT)
Age: 68
End: 2023-03-28

## 2023-03-28 VITALS
DIASTOLIC BLOOD PRESSURE: 80 MMHG | HEART RATE: 92 BPM | SYSTOLIC BLOOD PRESSURE: 140 MMHG | RESPIRATION RATE: 18 BRPM | HEIGHT: 70 IN | OXYGEN SATURATION: 96 %

## 2023-03-28 VITALS
HEIGHT: 70 IN | OXYGEN SATURATION: 98 % | DIASTOLIC BLOOD PRESSURE: 62 MMHG | BODY MASS INDEX: 35.93 KG/M2 | WEIGHT: 251 LBS | SYSTOLIC BLOOD PRESSURE: 135 MMHG | HEART RATE: 96 BPM

## 2023-03-28 PROCEDURE — 99215 OFFICE O/P EST HI 40 MIN: CPT | Mod: 25

## 2023-03-28 PROCEDURE — 36415 COLL VENOUS BLD VENIPUNCTURE: CPT

## 2023-03-28 PROCEDURE — 94010 BREATHING CAPACITY TEST: CPT

## 2023-03-28 PROCEDURE — 94729 DIFFUSING CAPACITY: CPT

## 2023-03-28 PROCEDURE — 94726 PLETHYSMOGRAPHY LUNG VOLUMES: CPT

## 2023-03-28 NOTE — DISCUSSION/SUMMARY
[FreeTextEntry1] : ---Assessment plan----------The patient has been referred here for further opinion regarding pulmonary problem,\par 67 yo last seen initially in 2017 ------. Pt w/h/o DVT 20 years ago- treated w/coumadin x 6 months. Developed another left leg dvt  2017--- was treated with anticoagulations-------\par PMH of MARCIE\par At present complaining of dyspnea suggestive of hyperreactive airway disease\par \par 1 pft , LABS\par 2 H/O DVT -ON ELQUIS  2.5 MG PO BID \par 3 cxr  and then  CTA [ H/O DVT IN 2017 ] - - - \par 4 exercise echo \par 5h/o marcie-   needs  repeat psg  and then cpap f/u in 2 months \par \par \par Thanks for allowing  me to participate  in the care of this patient.  Patient at this time  will follow  the above mentioned recommendations and return back for follow up visit. If you have any questions  I can be reached  at # [phone] (office).\par \par Re Irvin MD, FCCP \par Pulmonary, Critical Care and Sleep Medicine\par

## 2023-03-28 NOTE — REVIEW OF SYSTEMS
[Cough] : cough [Fever] : no fever [Dry Eyes] : no dry eyes [Chest Discomfort] : no chest discomfort [Hay Fever] : no hay fever [Frequency] : no dysuria [Trauma/ Injury] : no trauma/ injury [Rash] : no rash [Anemia] : no anemia [Headache] : no headache [Depression] : no depression [Obesity] : no obesity

## 2023-03-28 NOTE — PHYSICAL EXAM
[No Acute Distress] : no acute distress [Normal Oropharynx] : normal oropharynx [II] : Mallampati Class: II [No Neck Mass] : no neck mass [Normal S1, S2] : normal s1, s2 [No Resp Distress] : no resp distress [No Abnormalities] : no abnormalities [Benign] : benign [Normal Gait] : normal gait [No Clubbing] : no clubbing [Normal Color/ Pigmentation] : normal color/ pigmentation [No Focal Deficits] : no focal deficits [Oriented x3] : oriented x3

## 2023-03-29 LAB
ALBUMIN SERPL ELPH-MCNC: 4.5 G/DL
ALP BLD-CCNC: 72 U/L
ALT SERPL-CCNC: 25 U/L
ANION GAP SERPL CALC-SCNC: 13 MMOL/L
AST SERPL-CCNC: 29 U/L
BASOPHILS # BLD AUTO: 0.03 K/UL
BASOPHILS NFR BLD AUTO: 0.5 %
BILIRUB SERPL-MCNC: 0.4 MG/DL
BUN SERPL-MCNC: 17 MG/DL
CALCIUM SERPL-MCNC: 9.6 MG/DL
CHLORIDE SERPL-SCNC: 103 MMOL/L
CO2 SERPL-SCNC: 26 MMOL/L
CREAT SERPL-MCNC: 1.28 MG/DL
DEPRECATED KAPPA LC FREE/LAMBDA SER: 1.73 RATIO
EGFR: 61 ML/MIN/1.73M2
EOSINOPHIL # BLD AUTO: 0.1 K/UL
EOSINOPHIL # BLD MANUAL: 110 /UL
EOSINOPHIL NFR BLD AUTO: 1.6 %
GLUCOSE SERPL-MCNC: 122 MG/DL
HCT VFR BLD CALC: 43.8 %
HGB BLD-MCNC: 14 G/DL
IGA SER QL IEP: 251 MG/DL
IGG SER QL IEP: 1167 MG/DL
IGM SER QL IEP: 114 MG/DL
IMM GRANULOCYTES NFR BLD AUTO: 0.3 %
KAPPA LC CSF-MCNC: 1.24 MG/DL
KAPPA LC SERPL-MCNC: 2.14 MG/DL
LYMPHOCYTES # BLD AUTO: 2.45 K/UL
LYMPHOCYTES NFR BLD AUTO: 39.9 %
MAN DIFF?: NORMAL
MCHC RBC-ENTMCNC: 26.6 PG
MCHC RBC-ENTMCNC: 32 GM/DL
MCV RBC AUTO: 83.3 FL
MONOCYTES # BLD AUTO: 0.32 K/UL
MONOCYTES NFR BLD AUTO: 5.2 %
NEUTROPHILS # BLD AUTO: 3.22 K/UL
NEUTROPHILS NFR BLD AUTO: 52.5 %
PLATELET # BLD AUTO: 217 K/UL
POTASSIUM SERPL-SCNC: 3.8 MMOL/L
PROT SERPL-MCNC: 7 G/DL
RBC # BLD: 5.26 M/UL
RBC # FLD: 16.3 %
SODIUM SERPL-SCNC: 142 MMOL/L
WBC # FLD AUTO: 6.14 K/UL

## 2023-03-30 ENCOUNTER — NON-APPOINTMENT (OUTPATIENT)
Age: 68
End: 2023-03-30

## 2023-03-30 LAB
A ALTERNATA IGE QN: <0.1 KUA/L
A FUMIGATUS IGE QN: <0.1 KUA/L
BERMUDA GRASS IGE QN: 0.22 KUA/L
BOXELDER IGE QN: 0.18 KUA/L
C HERBARUM IGE QN: <0.1 KUA/L
CALIF WALNUT IGE QN: 0.12 KUA/L
CAT DANDER IGE QN: <0.1 KUA/L
CMN PIGWEED IGE QN: <0.1 KUA/L
COMMON RAGWEED IGE QN: 0.3 KUA/L
COTTONWOOD IGE QN: <0.1 KUA/L
D FARINAE IGE QN: <0.1 KUA/L
D PTERONYSS IGE QN: <0.1 KUA/L
DEPRECATED A ALTERNATA IGE RAST QL: 0
DEPRECATED A FUMIGATUS IGE RAST QL: 0
DEPRECATED BERMUDA GRASS IGE RAST QL: NORMAL
DEPRECATED BOXELDER IGE RAST QL: NORMAL
DEPRECATED C HERBARUM IGE RAST QL: 0
DEPRECATED CAT DANDER IGE RAST QL: 0
DEPRECATED COMMON PIGWEED IGE RAST QL: 0
DEPRECATED COMMON RAGWEED IGE RAST QL: NORMAL
DEPRECATED COTTONWOOD IGE RAST QL: 0
DEPRECATED D FARINAE IGE RAST QL: 0
DEPRECATED D PTERONYSS IGE RAST QL: 0
DEPRECATED DOG DANDER IGE RAST QL: 0
DEPRECATED GOOSEFOOT IGE RAST QL: NORMAL
DEPRECATED LONDON PLANE IGE RAST QL: 0
DEPRECATED MOUSE URINE PROT IGE RAST QL: 0
DEPRECATED MUGWORT IGE RAST QL: 1
DEPRECATED P NOTATUM IGE RAST QL: 0
DEPRECATED RED CEDAR IGE RAST QL: NORMAL
DEPRECATED ROACH IGE RAST QL: 0
DEPRECATED SHEEP SORREL IGE RAST QL: NORMAL
DEPRECATED SILVER BIRCH IGE RAST QL: 0
DEPRECATED TIMOTHY IGE RAST QL: NORMAL
DEPRECATED WHITE ASH IGE RAST QL: 1
DEPRECATED WHITE OAK IGE RAST QL: NORMAL
DOG DANDER IGE QN: <0.1 KUA/L
GOOSEFOOT IGE QN: 0.14 KUA/L
LONDON PLANE IGE QN: <0.1 KUA/L
MOUSE URINE PROT IGE QN: <0.1 KUA/L
MUGWORT IGE QN: 0.41 KUA/L
MULBERRY (T70) CLASS: 0
MULBERRY (T70) CONC: <0.1 KUA/L
P NOTATUM IGE QN: <0.1 KUA/L
RED CEDAR IGE QN: 0.12 KUA/L
ROACH IGE QN: <0.1 KUA/L
SHEEP SORREL IGE QN: 0.1 KUA/L
SILVER BIRCH IGE QN: <0.1 KUA/L
TIMOTHY IGE QN: 0.12 KUA/L
TOTAL IGE SMQN RAST: 57 KU/L
TREE ALLERG MIX1 IGE QL: NORMAL
WHITE ASH IGE QN: 0.46 KUA/L
WHITE ELM IGE QN: 0.18 KUA/L
WHITE ELM IGE QN: NORMAL
WHITE OAK IGE QN: 0.13 KUA/L

## 2023-03-31 ENCOUNTER — NON-APPOINTMENT (OUTPATIENT)
Age: 68
End: 2023-03-31

## 2023-04-01 LAB
M TB IFN-G BLD-IMP: NEGATIVE
QUANTIFERON TB PLUS MITOGEN MINUS NIL: >10 IU/ML
QUANTIFERON TB PLUS NIL: 0.02 IU/ML
QUANTIFERON TB PLUS TB1 MINUS NIL: 0 IU/ML
QUANTIFERON TB PLUS TB2 MINUS NIL: 0.01 IU/ML

## 2023-04-17 ENCOUNTER — OUTPATIENT (OUTPATIENT)
Dept: OUTPATIENT SERVICES | Facility: HOSPITAL | Age: 68
LOS: 1 days | End: 2023-04-17
Payer: MEDICARE

## 2023-04-17 ENCOUNTER — APPOINTMENT (OUTPATIENT)
Dept: CT IMAGING | Facility: IMAGING CENTER | Age: 68
End: 2023-04-17
Payer: MEDICARE

## 2023-04-17 DIAGNOSIS — I82.409 ACUTE EMBOLISM AND THROMBOSIS OF UNSPECIFIED DEEP VEINS OF UNSPECIFIED LOWER EXTREMITY: ICD-10-CM

## 2023-04-17 DIAGNOSIS — R07.9 CHEST PAIN, UNSPECIFIED: ICD-10-CM

## 2023-04-17 DIAGNOSIS — Z87.2 PERSONAL HISTORY OF DISEASES OF THE SKIN AND SUBCUTANEOUS TISSUE: Chronic | ICD-10-CM

## 2023-04-17 PROCEDURE — 71275 CT ANGIOGRAPHY CHEST: CPT

## 2023-04-17 PROCEDURE — 71275 CT ANGIOGRAPHY CHEST: CPT | Mod: 26,MH

## 2023-05-04 ENCOUNTER — APPOINTMENT (OUTPATIENT)
Dept: PULMONOLOGY | Facility: CLINIC | Age: 68
End: 2023-05-04
Payer: MEDICARE

## 2023-05-04 DIAGNOSIS — Z20.822 CONTACT WITH AND (SUSPECTED) EXPOSURE TO COVID-19: ICD-10-CM

## 2023-05-04 LAB — SARS-COV-2 AG RESP QL IA.RAPID: NEGATIVE

## 2023-05-04 PROCEDURE — 87811 SARS-COV-2 COVID19 W/OPTIC: CPT

## 2023-05-09 ENCOUNTER — APPOINTMENT (OUTPATIENT)
Dept: PULMONOLOGY | Facility: CLINIC | Age: 68
End: 2023-05-09
Payer: COMMERCIAL

## 2023-05-09 ENCOUNTER — APPOINTMENT (OUTPATIENT)
Dept: PULMONOLOGY | Facility: CLINIC | Age: 68
End: 2023-05-09
Payer: MEDICARE

## 2023-05-09 VITALS
HEART RATE: 96 BPM | HEIGHT: 70 IN | WEIGHT: 251 LBS | OXYGEN SATURATION: 96 % | BODY MASS INDEX: 35.93 KG/M2 | SYSTOLIC BLOOD PRESSURE: 138 MMHG | DIASTOLIC BLOOD PRESSURE: 80 MMHG | RESPIRATION RATE: 14 BRPM | TEMPERATURE: 97.3 F

## 2023-05-09 DIAGNOSIS — J45.909 UNSPECIFIED ASTHMA, UNCOMPLICATED: ICD-10-CM

## 2023-05-09 DIAGNOSIS — G47.33 OBSTRUCTIVE SLEEP APNEA (ADULT) (PEDIATRIC): ICD-10-CM

## 2023-05-09 DIAGNOSIS — I82.409 ACUTE EMBOLISM AND THROMBOSIS OF UNSPECIFIED DEEP VEINS OF UNSPECIFIED LOWER EXTREMITY: ICD-10-CM

## 2023-05-09 PROCEDURE — 94618 PULMONARY STRESS TESTING: CPT

## 2023-05-09 PROCEDURE — 99215 OFFICE O/P EST HI 40 MIN: CPT | Mod: 25

## 2023-05-09 PROCEDURE — ZZZZZ: CPT

## 2023-05-10 NOTE — HISTORY OF PRESENT ILLNESS
[Never] : never [TextBox_4] : This letter  is regarding your patient  who  attended pulmonary out patient office today.  I have reviewed  patient's  past history, social history, family history and medication list. I also  reviewed nurse practitioners/ and fellows  notes and assessment and agree with it.  \par The patient was referred by Dr.n. Jung\par \par 69 yo last seen by us in 2017\par . Pt w/h/o DVT 20 years ago- treated w/coumadin x 6 months. Developed another left leg dvt  2017\par PMH of MARCIE\par \par ------No history of , fever, chills , rigors, chest pain, or hemoptysis. Questionable history of Raynaud's phenomenon. No h/o significant weight loss in last few months. No history of liver dysfunction , collagen vascular disorder or chronic thromboembolic disease. I would classify the patient's dyspnea as WHO  FUNCTIONAL CLASS II--------\par \par ----Echo  date------2022---PASP 22 [facility] ----\par ----Pft date---------2023---normal\par ----Ct scan date------2023----no evidence of any pulmonary emboli\par \par march 2023-----reports slight exercise related dyspnea------ cardiac work-up as per patient is normal--------- needs chest x-ray PFT and labs-----history suggestive of hyperreactive airway disease----\par \par 5/2023 no further syncopal episodes, no dyspnea\par  H/O DVT -ON ELQUIS----- ----CTA does not show any embolism\par Cause of dyspnea is not clear ---- will obtain cardiac stress echo\par h/o marcie- untreated

## 2023-05-10 NOTE — DISCUSSION/SUMMARY
[FreeTextEntry1] : ---Assessment plan----------The patient has been referred here for further opinion regarding pulmonary problem,\par 67 yo last seen initially in 2017 ------. Pt w/h/o DVT 20 years ago- treated w/coumadin x 6 months. Developed another left leg dvt  2017--- was treated with anticoagulations-------\par PMH of MARCIE\par At present complaining of dyspnea suggestive of hyperreactive airway disease\par \par 1 pft , LABS\par 2 H/O DVT -ON ELQUIS  2.5 MG PO BID \par 3 cxr  and then  CTA [ H/O DVT IN 2017 ] - - -no evidence of PE\par 4 exercise echo  and EOT (today)\par 5) h/o marcie-   needs  repeat sleep study- in lab SPLIT study ordered\par \par \par Thanks for allowing  me to participate  in the care of this patient.  Patient at this time  will follow  the above mentioned recommendations and return back for follow up visit. If you have any questions  I can be reached  at # [phone] (office).\par \par Re Irvin MD, FCCP \par Pulmonary, Critical Care and Sleep Medicine\par

## 2023-05-10 NOTE — END OF VISIT
[Time Spent: ___ minutes] : I have spent [unfilled] minutes of time on the encounter. [FreeTextEntry3] : \par  I, Dr. Re Irvin  personally performed the evaluation and management (E/M) services for this established patient who presents today with (a) new problem(s)/exacerbation of (an) existing condition(s). That E/M includes conducting the clinically appropriate interval history &/or exam, assessing all new/exacerbated conditions, and establishing a new plan of care. Today, my CHEL, Elaina Bean NP, , was here to observe my evaluation and management service for this new problem/exacerbated condition and follow the plan of care established by me going forward.\par

## 2023-06-05 ENCOUNTER — APPOINTMENT (OUTPATIENT)
Dept: UROLOGY | Facility: CLINIC | Age: 68
End: 2023-06-05
Payer: MEDICARE

## 2023-06-05 VITALS
OXYGEN SATURATION: 95 % | TEMPERATURE: 98.6 F | DIASTOLIC BLOOD PRESSURE: 82 MMHG | RESPIRATION RATE: 17 BRPM | SYSTOLIC BLOOD PRESSURE: 146 MMHG | HEART RATE: 82 BPM

## 2023-06-05 PROCEDURE — 99214 OFFICE O/P EST MOD 30 MIN: CPT

## 2023-06-05 RX ORDER — OMEPRAZOLE 20 MG/1
20 CAPSULE, DELAYED RELEASE ORAL
Qty: 30 | Refills: 3 | Status: COMPLETED | COMMUNITY
Start: 2017-03-27 | End: 2023-06-05

## 2023-06-05 RX ORDER — TAMSULOSIN HYDROCHLORIDE 0.4 MG/1
0.4 CAPSULE ORAL
Qty: 30 | Refills: 0 | Status: COMPLETED | COMMUNITY
Start: 2017-03-07 | End: 2023-06-05

## 2023-06-05 RX ORDER — SULFAMETHOXAZOLE AND TRIMETHOPRIM 800; 160 MG/1; MG/1
800-160 TABLET ORAL TWICE DAILY
Qty: 20 | Refills: 3 | Status: COMPLETED | COMMUNITY
Start: 2021-04-29 | End: 2023-06-05

## 2023-06-05 RX ORDER — LOSARTAN POTASSIUM AND HYDROCHLOROTHIAZIDE 100; 25 MG/1; MG/1
100-25 TABLET, FILM COATED ORAL
Refills: 0 | Status: ACTIVE | COMMUNITY

## 2023-06-05 RX ORDER — OMEPRAZOLE 20 MG/1
20 CAPSULE, DELAYED RELEASE ORAL
Qty: 30 | Refills: 0 | Status: COMPLETED | COMMUNITY
Start: 2017-07-30 | End: 2023-06-05

## 2023-06-05 RX ORDER — APIXABAN 2.5 MG/1
2.5 TABLET, FILM COATED ORAL
Qty: 60 | Refills: 0 | Status: COMPLETED | COMMUNITY
Start: 2017-05-09 | End: 2023-06-05

## 2023-06-05 RX ORDER — SILODOSIN 8 MG/1
8 CAPSULE ORAL
Qty: 90 | Refills: 3 | Status: COMPLETED | COMMUNITY
Start: 2021-06-28 | End: 2023-06-05

## 2023-06-05 RX ORDER — VALSARTAN 40 MG/1
40 TABLET, COATED ORAL
Refills: 0 | Status: COMPLETED | COMMUNITY
Start: 2017-04-12 | End: 2023-06-05

## 2023-06-05 RX ORDER — RIVAROXABAN 10 MG/1
10 TABLET, FILM COATED ORAL
Refills: 0 | Status: ACTIVE | COMMUNITY

## 2023-06-05 NOTE — HISTORY OF PRESENT ILLNESS
[FreeTextEntry1] : 68 yr old male patient with hx of HTN, HLD and DVTx 2, currently on Xarelto presents to office today for annual check up. \par \par last PSA  2.32 ng/mL (Dec 2022) \par \par DTF q4-5hrs \par Nocturia 1-2x \par denies incontinence \par denies hematuria and dysuria \par moderate to strong urine stream \par feels emptied post void \par denies constipation \par \par denies hx of UTI \par \par UDS done June 2021\par \par never smoker \par no fam hx of prostate CA \par \par 3 cups of regular coffee \par no tea\par 32oz of water

## 2023-06-05 NOTE — PHYSICAL EXAM
[General Appearance - Well Developed] : well developed [Normal Appearance] : normal appearance [Abdomen Soft] : soft [Edema] : no peripheral edema [] : no respiratory distress [Not Anxious] : not anxious [Normal Station and Gait] : the gait and station were normal for the patient's age

## 2023-06-05 NOTE — END OF VISIT
[FreeTextEntry3] : psa wnl\par \par BPH LUTS - no complaints, off meds\par \par counseled / educated pt on symptoms of BPH \par \par recommend no further PSA checking once age of 70 \par \par rto 2 years

## 2025-03-28 ENCOUNTER — NON-APPOINTMENT (OUTPATIENT)
Age: 70
End: 2025-03-28

## 2025-07-22 ENCOUNTER — NON-APPOINTMENT (OUTPATIENT)
Age: 70
End: 2025-07-22

## 2025-07-23 ENCOUNTER — APPOINTMENT (OUTPATIENT)
Dept: UROLOGY | Facility: CLINIC | Age: 70
End: 2025-07-23
Payer: MEDICARE

## 2025-07-23 PROCEDURE — 99204 OFFICE O/P NEW MOD 45 MIN: CPT

## 2025-07-23 PROCEDURE — 99214 OFFICE O/P EST MOD 30 MIN: CPT

## 2025-07-30 ENCOUNTER — APPOINTMENT (OUTPATIENT)
Dept: UROLOGY | Facility: CLINIC | Age: 70
End: 2025-07-30
Payer: MEDICARE

## 2025-07-30 DIAGNOSIS — N40.1 BENIGN PROSTATIC HYPERPLASIA WITH LOWER URINARY TRACT SYMPMS: ICD-10-CM

## 2025-07-30 DIAGNOSIS — R97.20 ELEVATED PROSTATE, SPECIFIC ANTIGEN [PSA]: ICD-10-CM

## 2025-07-30 DIAGNOSIS — R35.1 BENIGN PROSTATIC HYPERPLASIA WITH LOWER URINARY TRACT SYMPMS: ICD-10-CM

## 2025-07-30 PROCEDURE — 99204 OFFICE O/P NEW MOD 45 MIN: CPT | Mod: 2W

## 2025-07-30 PROCEDURE — G2211 COMPLEX E/M VISIT ADD ON: CPT | Mod: 2W

## 2025-07-31 ENCOUNTER — LABORATORY RESULT (OUTPATIENT)
Age: 70
End: 2025-07-31

## 2025-08-01 LAB
APPEARANCE: CLEAR
BILIRUBIN URINE: NEGATIVE
BLOOD URINE: NEGATIVE
COLOR: YELLOW
GLUCOSE QUALITATIVE U: NEGATIVE MG/DL
KETONES URINE: NEGATIVE MG/DL
LEUKOCYTE ESTERASE URINE: ABNORMAL
NITRITE URINE: POSITIVE
PH URINE: 5.5
PROTEIN URINE: NEGATIVE MG/DL
PSA FREE FLD-MCNC: 11 %
PSA FREE SERPL-MCNC: 0.85 NG/ML
PSA SERPL-MCNC: 7.69 NG/ML
SPECIFIC GRAVITY URINE: 1.02
UROBILINOGEN URINE: 0.2 MG/DL

## 2025-08-04 RX ORDER — SULFAMETHOXAZOLE AND TRIMETHOPRIM 800; 160 MG/1; MG/1
800-160 TABLET ORAL TWICE DAILY
Qty: 28 | Refills: 0 | Status: ACTIVE | COMMUNITY
Start: 2025-08-04 | End: 1900-01-01

## 2025-08-08 ENCOUNTER — APPOINTMENT (OUTPATIENT)
Dept: MRI IMAGING | Facility: IMAGING CENTER | Age: 70
End: 2025-08-08

## 2025-08-08 ENCOUNTER — OUTPATIENT (OUTPATIENT)
Dept: OUTPATIENT SERVICES | Facility: HOSPITAL | Age: 70
LOS: 1 days | End: 2025-08-08
Payer: COMMERCIAL

## 2025-08-08 ENCOUNTER — RESULT REVIEW (OUTPATIENT)
Age: 70
End: 2025-08-08

## 2025-08-08 DIAGNOSIS — Z87.2 PERSONAL HISTORY OF DISEASES OF THE SKIN AND SUBCUTANEOUS TISSUE: Chronic | ICD-10-CM

## 2025-08-08 DIAGNOSIS — R97.20 ELEVATED PROSTATE SPECIFIC ANTIGEN [PSA]: ICD-10-CM

## 2025-08-08 PROCEDURE — 72197 MRI PELVIS W/O & W/DYE: CPT | Mod: 26

## 2025-08-08 PROCEDURE — A9585: CPT

## 2025-08-08 PROCEDURE — 76498 UNLISTED MR PROCEDURE: CPT

## 2025-08-08 PROCEDURE — 72197 MRI PELVIS W/O & W/DYE: CPT

## 2025-08-08 PROCEDURE — 76498P: CUSTOM | Mod: 26

## 2025-08-13 PROBLEM — R93.5 ABNORMAL MRI, PELVIS: Status: ACTIVE | Noted: 2025-08-13

## 2025-08-15 ENCOUNTER — OUTPATIENT (OUTPATIENT)
Dept: OUTPATIENT SERVICES | Facility: HOSPITAL | Age: 70
LOS: 1 days | End: 2025-08-15
Payer: COMMERCIAL

## 2025-08-15 DIAGNOSIS — Z87.2 PERSONAL HISTORY OF DISEASES OF THE SKIN AND SUBCUTANEOUS TISSUE: Chronic | ICD-10-CM

## 2025-08-15 DIAGNOSIS — R97.20 ELEVATED PROSTATE SPECIFIC ANTIGEN [PSA]: ICD-10-CM

## 2025-08-15 PROCEDURE — C8001: CPT

## 2025-08-21 ENCOUNTER — APPOINTMENT (OUTPATIENT)
Dept: UROLOGY | Facility: CLINIC | Age: 70
End: 2025-08-21
Payer: MEDICARE

## 2025-08-21 VITALS
OXYGEN SATURATION: 99 % | WEIGHT: 251 LBS | SYSTOLIC BLOOD PRESSURE: 103 MMHG | HEIGHT: 70 IN | RESPIRATION RATE: 17 BRPM | TEMPERATURE: 98.5 F | BODY MASS INDEX: 35.93 KG/M2 | DIASTOLIC BLOOD PRESSURE: 68 MMHG | HEART RATE: 70 BPM

## 2025-08-21 DIAGNOSIS — R93.5 ABNORMAL FINDINGS ON DIAGNOSTIC IMAGING OF OTHER ABDOMINAL REGIONS, INCLUDING RETROPERITONEUM: ICD-10-CM

## 2025-08-21 DIAGNOSIS — R97.20 ELEVATED PROSTATE, SPECIFIC ANTIGEN [PSA]: ICD-10-CM

## 2025-08-21 PROCEDURE — G2211 COMPLEX E/M VISIT ADD ON: CPT

## 2025-08-21 PROCEDURE — 99214 OFFICE O/P EST MOD 30 MIN: CPT

## 2025-08-23 LAB
PSA FREE FLD-MCNC: 19 %
PSA FREE SERPL-MCNC: 0.67 NG/ML
PSA SERPL-MCNC: 3.58 NG/ML

## 2025-08-27 DIAGNOSIS — R39.9 UNSPECIFIED SYMPTOMS AND SIGNS INVOLVING THE GENITOURINARY SYSTEM: ICD-10-CM

## 2025-08-28 ENCOUNTER — NON-APPOINTMENT (OUTPATIENT)
Age: 70
End: 2025-08-28

## 2025-08-29 LAB
APPEARANCE: CLEAR
BACTERIA: NEGATIVE /HPF
BILIRUBIN URINE: NEGATIVE
BLOOD URINE: NEGATIVE
CAST: 0 /LPF
COLOR: YELLOW
EPITHELIAL CELLS: 0 /HPF
GLUCOSE QUALITATIVE U: NEGATIVE MG/DL
KETONES URINE: NEGATIVE MG/DL
LEUKOCYTE ESTERASE URINE: ABNORMAL
MICROSCOPIC-UA: NORMAL
NITRITE URINE: NEGATIVE
PH URINE: 5.5
PROTEIN URINE: NEGATIVE MG/DL
RED BLOOD CELLS URINE: 0 /HPF
SPECIFIC GRAVITY URINE: 1.02
UROBILINOGEN URINE: 0.2 MG/DL
WHITE BLOOD CELLS URINE: 1 /HPF

## 2025-09-02 DIAGNOSIS — N39.0 URINARY TRACT INFECTION, SITE NOT SPECIFIED: ICD-10-CM

## 2025-09-02 DIAGNOSIS — A49.9 URINARY TRACT INFECTION, SITE NOT SPECIFIED: ICD-10-CM

## 2025-09-02 LAB — BACTERIA UR CULT: ABNORMAL

## 2025-09-02 RX ORDER — CIPROFLOXACIN HYDROCHLORIDE 500 MG/1
500 TABLET, FILM COATED ORAL
Qty: 14 | Refills: 0 | Status: ACTIVE | COMMUNITY
Start: 2025-09-02 | End: 1900-01-01